# Patient Record
Sex: FEMALE | Race: WHITE | NOT HISPANIC OR LATINO | URBAN - METROPOLITAN AREA
[De-identification: names, ages, dates, MRNs, and addresses within clinical notes are randomized per-mention and may not be internally consistent; named-entity substitution may affect disease eponyms.]

---

## 2017-07-09 ENCOUNTER — INPATIENT (INPATIENT)
Facility: HOSPITAL | Age: 37
LOS: 2 days | Discharge: HOME | End: 2017-07-12
Attending: SPECIALIST | Admitting: SPECIALIST

## 2017-07-09 DIAGNOSIS — Z3A.00 WEEKS OF GESTATION OF PREGNANCY NOT SPECIFIED: ICD-10-CM

## 2017-07-09 DIAGNOSIS — O26.899 OTHER SPECIFIED PREGNANCY RELATED CONDITIONS, UNSPECIFIED TRIMESTER: ICD-10-CM

## 2017-07-09 PROBLEM — Z00.00 ENCOUNTER FOR PREVENTIVE HEALTH EXAMINATION: Status: ACTIVE | Noted: 2017-07-09

## 2017-07-09 LAB
BASOPHILS # BLD AUTO: 0.02 K/UL — SIGNIFICANT CHANGE UP (ref 0–0.2)
BASOPHILS NFR BLD AUTO: 0.3 % — SIGNIFICANT CHANGE UP (ref 0–2)
BLD GP AB SCN SERPL QL: NEGATIVE — SIGNIFICANT CHANGE UP
EOSINOPHIL # BLD AUTO: 0.18 K/UL — SIGNIFICANT CHANGE UP (ref 0–0.5)
EOSINOPHIL NFR BLD AUTO: 2.3 % — SIGNIFICANT CHANGE UP (ref 0–6)
HCT VFR BLD CALC: 32.9 % — LOW (ref 34.5–45)
HGB BLD-MCNC: 11 G/DL — LOW (ref 11.5–15.5)
IMM GRANULOCYTES # BLD AUTO: 0.02 # — SIGNIFICANT CHANGE UP
IMM GRANULOCYTES NFR BLD AUTO: 0.3 % — SIGNIFICANT CHANGE UP (ref 0–1.5)
LYMPHOCYTES # BLD AUTO: 1.94 K/UL — SIGNIFICANT CHANGE UP (ref 1–3.3)
LYMPHOCYTES # BLD AUTO: 24.4 % — SIGNIFICANT CHANGE UP (ref 13–44)
MCHC RBC-ENTMCNC: 31.3 PG — SIGNIFICANT CHANGE UP (ref 27–34)
MCHC RBC-ENTMCNC: 33.4 % — SIGNIFICANT CHANGE UP (ref 32–36)
MCV RBC AUTO: 93.7 FL — SIGNIFICANT CHANGE UP (ref 80–100)
MONOCYTES # BLD AUTO: 0.57 K/UL — SIGNIFICANT CHANGE UP (ref 0–0.9)
MONOCYTES NFR BLD AUTO: 7.2 % — SIGNIFICANT CHANGE UP (ref 2–14)
NEUTROPHILS # BLD AUTO: 5.21 K/UL — SIGNIFICANT CHANGE UP (ref 1.8–7.4)
NEUTROPHILS NFR BLD AUTO: 65.5 % — SIGNIFICANT CHANGE UP (ref 43–77)
NRBC # FLD: 0 — SIGNIFICANT CHANGE UP
PLATELET # BLD AUTO: 164 K/UL — SIGNIFICANT CHANGE UP (ref 150–400)
PMV BLD: 12.1 FL — SIGNIFICANT CHANGE UP (ref 7–13)
RBC # BLD: 3.51 M/UL — LOW (ref 3.8–5.2)
RBC # FLD: 14 % — SIGNIFICANT CHANGE UP (ref 10.3–14.5)
RH IG SCN BLD-IMP: POSITIVE — SIGNIFICANT CHANGE UP
RH IG SCN BLD-IMP: POSITIVE — SIGNIFICANT CHANGE UP
WBC # BLD: 7.94 K/UL — SIGNIFICANT CHANGE UP (ref 3.8–10.5)
WBC # FLD AUTO: 7.94 K/UL — SIGNIFICANT CHANGE UP (ref 3.8–10.5)

## 2017-07-09 RX ORDER — SODIUM CHLORIDE 9 MG/ML
1000 INJECTION, SOLUTION INTRAVENOUS ONCE
Qty: 0 | Refills: 0 | Status: DISCONTINUED | OUTPATIENT
Start: 2017-07-09 | End: 2017-07-10

## 2017-07-09 RX ORDER — SODIUM CHLORIDE 9 MG/ML
1000 INJECTION, SOLUTION INTRAVENOUS
Qty: 0 | Refills: 0 | Status: DISCONTINUED | OUTPATIENT
Start: 2017-07-09 | End: 2017-07-10

## 2017-07-09 RX ORDER — OXYTOCIN 10 UNIT/ML
333.33 VIAL (ML) INJECTION
Qty: 20 | Refills: 0 | Status: DISCONTINUED | OUTPATIENT
Start: 2017-07-09 | End: 2017-07-10

## 2017-07-09 RX ADMIN — SODIUM CHLORIDE 125 MILLILITER(S): 9 INJECTION, SOLUTION INTRAVENOUS at 21:14

## 2017-07-10 VITALS — WEIGHT: 158.73 LBS | HEIGHT: 63 IN

## 2017-07-10 LAB — T PALLIDUM AB TITR SER: NEGATIVE — SIGNIFICANT CHANGE UP

## 2017-07-10 RX ORDER — MORPHINE SULFATE 50 MG/1
4 CAPSULE, EXTENDED RELEASE ORAL ONCE
Qty: 0 | Refills: 0 | Status: DISCONTINUED | OUTPATIENT
Start: 2017-07-10 | End: 2017-07-10

## 2017-07-10 RX ORDER — HYDROCORTISONE 1 %
1 OINTMENT (GRAM) TOPICAL EVERY 4 HOURS
Qty: 0 | Refills: 0 | Status: DISCONTINUED | OUTPATIENT
Start: 2017-07-10 | End: 2017-07-10

## 2017-07-10 RX ORDER — OXYCODONE HYDROCHLORIDE 5 MG/1
5 TABLET ORAL
Qty: 0 | Refills: 0 | Status: DISCONTINUED | OUTPATIENT
Start: 2017-07-10 | End: 2017-07-12

## 2017-07-10 RX ORDER — SIMETHICONE 80 MG/1
80 TABLET, CHEWABLE ORAL EVERY 6 HOURS
Qty: 0 | Refills: 0 | Status: DISCONTINUED | OUTPATIENT
Start: 2017-07-10 | End: 2017-07-12

## 2017-07-10 RX ORDER — LANOLIN
1 OINTMENT (GRAM) TOPICAL EVERY 6 HOURS
Qty: 0 | Refills: 0 | Status: DISCONTINUED | OUTPATIENT
Start: 2017-07-10 | End: 2017-07-12

## 2017-07-10 RX ORDER — MAGNESIUM HYDROXIDE 400 MG/1
30 TABLET, CHEWABLE ORAL
Qty: 0 | Refills: 0 | Status: DISCONTINUED | OUTPATIENT
Start: 2017-07-10 | End: 2017-07-12

## 2017-07-10 RX ORDER — IBUPROFEN 200 MG
600 TABLET ORAL EVERY 6 HOURS
Qty: 0 | Refills: 0 | Status: COMPLETED | OUTPATIENT
Start: 2017-07-10 | End: 2018-06-08

## 2017-07-10 RX ORDER — KETOROLAC TROMETHAMINE 30 MG/ML
30 SYRINGE (ML) INJECTION ONCE
Qty: 0 | Refills: 0 | Status: DISCONTINUED | OUTPATIENT
Start: 2017-07-10 | End: 2017-07-10

## 2017-07-10 RX ORDER — DIBUCAINE 1 %
1 OINTMENT (GRAM) RECTAL EVERY 4 HOURS
Qty: 0 | Refills: 0 | Status: DISCONTINUED | OUTPATIENT
Start: 2017-07-10 | End: 2017-07-12

## 2017-07-10 RX ORDER — SODIUM CHLORIDE 9 MG/ML
3 INJECTION INTRAMUSCULAR; INTRAVENOUS; SUBCUTANEOUS EVERY 8 HOURS
Qty: 0 | Refills: 0 | Status: DISCONTINUED | OUTPATIENT
Start: 2017-07-10 | End: 2017-07-11

## 2017-07-10 RX ORDER — PRAMOXINE HYDROCHLORIDE 150 MG/15G
1 AEROSOL, FOAM RECTAL EVERY 4 HOURS
Qty: 0 | Refills: 0 | Status: DISCONTINUED | OUTPATIENT
Start: 2017-07-10 | End: 2017-07-12

## 2017-07-10 RX ORDER — PRAMOXINE HYDROCHLORIDE 150 MG/15G
1 AEROSOL, FOAM RECTAL EVERY 4 HOURS
Qty: 0 | Refills: 0 | Status: DISCONTINUED | OUTPATIENT
Start: 2017-07-10 | End: 2017-07-10

## 2017-07-10 RX ORDER — AER TRAVELER 0.5 G/1
1 SOLUTION RECTAL; TOPICAL EVERY 4 HOURS
Qty: 0 | Refills: 0 | Status: DISCONTINUED | OUTPATIENT
Start: 2017-07-10 | End: 2017-07-12

## 2017-07-10 RX ORDER — DIPHENHYDRAMINE HCL 50 MG
25 CAPSULE ORAL EVERY 6 HOURS
Qty: 0 | Refills: 0 | Status: DISCONTINUED | OUTPATIENT
Start: 2017-07-10 | End: 2017-07-12

## 2017-07-10 RX ORDER — IBUPROFEN 200 MG
600 TABLET ORAL EVERY 6 HOURS
Qty: 0 | Refills: 0 | Status: DISCONTINUED | OUTPATIENT
Start: 2017-07-10 | End: 2017-07-12

## 2017-07-10 RX ORDER — GLYCERIN ADULT
1 SUPPOSITORY, RECTAL RECTAL AT BEDTIME
Qty: 0 | Refills: 0 | Status: DISCONTINUED | OUTPATIENT
Start: 2017-07-10 | End: 2017-07-12

## 2017-07-10 RX ORDER — OXYCODONE HYDROCHLORIDE 5 MG/1
5 TABLET ORAL EVERY 4 HOURS
Qty: 0 | Refills: 0 | Status: DISCONTINUED | OUTPATIENT
Start: 2017-07-10 | End: 2017-07-12

## 2017-07-10 RX ORDER — OXYTOCIN 10 UNIT/ML
2 VIAL (ML) INJECTION
Qty: 30 | Refills: 0 | Status: DISCONTINUED | OUTPATIENT
Start: 2017-07-10 | End: 2017-07-10

## 2017-07-10 RX ORDER — HYDROCORTISONE 1 %
1 OINTMENT (GRAM) TOPICAL EVERY 4 HOURS
Qty: 0 | Refills: 0 | Status: DISCONTINUED | OUTPATIENT
Start: 2017-07-10 | End: 2017-07-12

## 2017-07-10 RX ORDER — OXYTOCIN 10 UNIT/ML
41.67 VIAL (ML) INJECTION
Qty: 20 | Refills: 0 | Status: DISCONTINUED | OUTPATIENT
Start: 2017-07-10 | End: 2017-07-10

## 2017-07-10 RX ORDER — DOCUSATE SODIUM 100 MG
100 CAPSULE ORAL
Qty: 0 | Refills: 0 | Status: DISCONTINUED | OUTPATIENT
Start: 2017-07-10 | End: 2017-07-12

## 2017-07-10 RX ORDER — TETANUS TOXOID, REDUCED DIPHTHERIA TOXOID AND ACELLULAR PERTUSSIS VACCINE, ADSORBED 5; 2.5; 8; 8; 2.5 [IU]/.5ML; [IU]/.5ML; UG/.5ML; UG/.5ML; UG/.5ML
0.5 SUSPENSION INTRAMUSCULAR ONCE
Qty: 0 | Refills: 0 | Status: DISCONTINUED | OUTPATIENT
Start: 2017-07-10 | End: 2017-07-12

## 2017-07-10 RX ORDER — SODIUM CHLORIDE 9 MG/ML
3 INJECTION INTRAMUSCULAR; INTRAVENOUS; SUBCUTANEOUS EVERY 8 HOURS
Qty: 0 | Refills: 0 | Status: DISCONTINUED | OUTPATIENT
Start: 2017-07-10 | End: 2017-07-10

## 2017-07-10 RX ORDER — DIBUCAINE 1 %
1 OINTMENT (GRAM) RECTAL EVERY 4 HOURS
Qty: 0 | Refills: 0 | Status: DISCONTINUED | OUTPATIENT
Start: 2017-07-10 | End: 2017-07-10

## 2017-07-10 RX ORDER — ACETAMINOPHEN 500 MG
975 TABLET ORAL EVERY 6 HOURS
Qty: 0 | Refills: 0 | Status: COMPLETED | OUTPATIENT
Start: 2017-07-10 | End: 2018-06-08

## 2017-07-10 RX ORDER — AER TRAVELER 0.5 G/1
1 SOLUTION RECTAL; TOPICAL EVERY 4 HOURS
Qty: 0 | Refills: 0 | Status: DISCONTINUED | OUTPATIENT
Start: 2017-07-10 | End: 2017-07-10

## 2017-07-10 RX ORDER — ACETAMINOPHEN 500 MG
975 TABLET ORAL EVERY 6 HOURS
Qty: 0 | Refills: 0 | Status: DISCONTINUED | OUTPATIENT
Start: 2017-07-10 | End: 2017-07-12

## 2017-07-10 RX ADMIN — SODIUM CHLORIDE 3 MILLILITER(S): 9 INJECTION INTRAMUSCULAR; INTRAVENOUS; SUBCUTANEOUS at 16:33

## 2017-07-10 RX ADMIN — MORPHINE SULFATE 4 MILLIGRAM(S): 50 CAPSULE, EXTENDED RELEASE ORAL at 01:42

## 2017-07-10 RX ADMIN — Medication 125 MILLIUNIT(S)/MIN: at 10:21

## 2017-07-10 RX ADMIN — Medication 975 MILLIGRAM(S): at 18:39

## 2017-07-10 RX ADMIN — Medication 975 MILLIGRAM(S): at 18:05

## 2017-07-10 RX ADMIN — Medication 2 MILLIUNIT(S)/MIN: at 05:44

## 2017-07-10 RX ADMIN — MORPHINE SULFATE 4 MILLIGRAM(S): 50 CAPSULE, EXTENDED RELEASE ORAL at 02:30

## 2017-07-10 NOTE — DISCHARGE NOTE OB - PATIENT PORTAL LINK FT
“You can access the FollowHealth Patient Portal, offered by [facility], by registering with the following website: [email or web address]”

## 2017-07-10 NOTE — DISCHARGE NOTE OB - MEDICATION SUMMARY - MEDICATIONS TO TAKE
I will START or STAY ON the medications listed below when I get home from the hospital:    Prenatal Multivitamins oral capsule  -- 1 tab(s) by mouth once a day  -- Indication: For Other pregnancy-related conditions, antepartum

## 2017-07-10 NOTE — DISCHARGE NOTE OB - MATERIALS PROVIDED
Vaccinations/[facility]  Screening Program/  Immunization Record/Breastfeeding Log/[facility] Hearing Screen Program/Guide to Postpartum Care/Bottle Feeding Log/Birth Certificate Instructions/Shaken Baby Prevention Handout

## 2017-07-10 NOTE — DISCHARGE NOTE OB - CARE PROVIDER_API CALL
Juliet Booker), Obstetrics and Gynecology  [address] [place] [address] [address]  [place] [address]  Phone: [phone]  Fax: [phone]

## 2017-07-10 NOTE — DISCHARGE NOTE OB - CARE PLAN
Principal Discharge DX:	 (normal spontaneous vaginal delivery)  Goal:	recovery  Instructions for follow-up, activity and diet:	regular

## 2017-07-11 RX ADMIN — SODIUM CHLORIDE 3 MILLILITER(S): 9 INJECTION INTRAMUSCULAR; INTRAVENOUS; SUBCUTANEOUS at 08:11

## 2017-07-11 RX ADMIN — Medication 975 MILLIGRAM(S): at 02:40

## 2017-07-11 RX ADMIN — SODIUM CHLORIDE 3 MILLILITER(S): 9 INJECTION INTRAMUSCULAR; INTRAVENOUS; SUBCUTANEOUS at 00:48

## 2017-07-11 RX ADMIN — Medication 1 TABLET(S): at 13:20

## 2017-07-11 NOTE — PROVIDER CONTACT NOTE (OTHER) - ASSESSMENT
RN re-assessed patient's heart by palpating a radial pulse of 58, however patient had just awoke from sleep at that time.  Patient denies any complaints of chest pain, palpitations, shortness of breath, dizziness or lightheadedness.

## 2017-07-11 NOTE — PROGRESS NOTE ADULT - SUBJECTIVE AND OBJECTIVE BOX
S: Patient doing well. Minimal lochia. Pain controlled.    O: Vital Signs Last 24 Hrs  T(C): 36.5 (2017 05:41), Max: 37.2 (10 Jul 2017 08:53)  T(F): 97.7 (2017 05:41), Max: 99 (10 Jul 2017 08:53)  HR: 56 (2017 05:41) (56 - 85)  BP: 115/75 (2017 05:41) (95/54 - 115/75)  BP(mean): --  RR: 18 (2017 05:41) (16 - 18)  SpO2: 98% (2017 05:41) (97% - 100%)    Gen: NAD  Abd: soft, NT, ND, fundus firm below umbilicus  Lochia: moderate  Ext: no tenderness    Labs:                        11.0   7.94  )-----------( 164      ( 2017 21:15 )             32.9   Apos    A: 36y PPD#1 s/p  doing well.    Plan:  continue care  discharge instructions given

## 2017-07-12 VITALS — HEART RATE: 70 BPM

## 2019-03-06 ENCOUNTER — INPATIENT (INPATIENT)
Facility: HOSPITAL | Age: 39
LOS: 1 days | Discharge: HOME | End: 2019-03-08
Attending: OBSTETRICS & GYNECOLOGY | Admitting: OBSTETRICS & GYNECOLOGY

## 2019-03-06 VITALS
RESPIRATION RATE: 16 BRPM | TEMPERATURE: 97 F | SYSTOLIC BLOOD PRESSURE: 112 MMHG | DIASTOLIC BLOOD PRESSURE: 61 MMHG | HEART RATE: 74 BPM

## 2019-03-06 DIAGNOSIS — Z3A.00 WEEKS OF GESTATION OF PREGNANCY NOT SPECIFIED: ICD-10-CM

## 2019-03-06 DIAGNOSIS — O26.90 PREGNANCY RELATED CONDITIONS, UNSPECIFIED, UNSPECIFIED TRIMESTER: ICD-10-CM

## 2019-03-06 LAB
BASOPHILS # BLD AUTO: 0.02 K/UL — SIGNIFICANT CHANGE UP (ref 0–0.2)
BASOPHILS NFR BLD AUTO: 0.2 % — SIGNIFICANT CHANGE UP (ref 0–2)
BLD GP AB SCN SERPL QL: NEGATIVE — SIGNIFICANT CHANGE UP
EOSINOPHIL # BLD AUTO: 0.18 K/UL — SIGNIFICANT CHANGE UP (ref 0–0.5)
EOSINOPHIL NFR BLD AUTO: 2.2 % — SIGNIFICANT CHANGE UP (ref 0–6)
HCT VFR BLD CALC: 35.8 % — SIGNIFICANT CHANGE UP (ref 34.5–45)
HGB BLD-MCNC: 11.9 G/DL — SIGNIFICANT CHANGE UP (ref 11.5–15.5)
IMM GRANULOCYTES NFR BLD AUTO: 0.4 % — SIGNIFICANT CHANGE UP (ref 0–1.5)
LYMPHOCYTES # BLD AUTO: 1.98 K/UL — SIGNIFICANT CHANGE UP (ref 1–3.3)
LYMPHOCYTES # BLD AUTO: 23.7 % — SIGNIFICANT CHANGE UP (ref 13–44)
MCHC RBC-ENTMCNC: 30 PG — SIGNIFICANT CHANGE UP (ref 27–34)
MCHC RBC-ENTMCNC: 33.2 % — SIGNIFICANT CHANGE UP (ref 32–36)
MCV RBC AUTO: 90.2 FL — SIGNIFICANT CHANGE UP (ref 80–100)
MONOCYTES # BLD AUTO: 0.54 K/UL — SIGNIFICANT CHANGE UP (ref 0–0.9)
MONOCYTES NFR BLD AUTO: 6.5 % — SIGNIFICANT CHANGE UP (ref 2–14)
NEUTROPHILS # BLD AUTO: 5.59 K/UL — SIGNIFICANT CHANGE UP (ref 1.8–7.4)
NEUTROPHILS NFR BLD AUTO: 67 % — SIGNIFICANT CHANGE UP (ref 43–77)
NRBC # FLD: 0 K/UL — LOW (ref 25–125)
PLATELET # BLD AUTO: 221 K/UL — SIGNIFICANT CHANGE UP (ref 150–400)
PMV BLD: 11.8 FL — SIGNIFICANT CHANGE UP (ref 7–13)
RBC # BLD: 3.97 M/UL — SIGNIFICANT CHANGE UP (ref 3.8–5.2)
RBC # FLD: 13.3 % — SIGNIFICANT CHANGE UP (ref 10.3–14.5)
RH IG SCN BLD-IMP: POSITIVE — SIGNIFICANT CHANGE UP
T PALLIDUM AB TITR SER: NEGATIVE — SIGNIFICANT CHANGE UP
WBC # BLD: 8.34 K/UL — SIGNIFICANT CHANGE UP (ref 3.8–10.5)
WBC # FLD AUTO: 8.34 K/UL — SIGNIFICANT CHANGE UP (ref 3.8–10.5)

## 2019-03-06 RX ORDER — OXYCODONE HYDROCHLORIDE 5 MG/1
5 TABLET ORAL
Qty: 0 | Refills: 0 | Status: DISCONTINUED | OUTPATIENT
Start: 2019-03-06 | End: 2019-03-08

## 2019-03-06 RX ORDER — SODIUM CHLORIDE 9 MG/ML
1000 INJECTION, SOLUTION INTRAVENOUS ONCE
Qty: 0 | Refills: 0 | Status: DISCONTINUED | OUTPATIENT
Start: 2019-03-06 | End: 2019-03-06

## 2019-03-06 RX ORDER — SIMETHICONE 80 MG/1
80 TABLET, CHEWABLE ORAL EVERY 6 HOURS
Qty: 0 | Refills: 0 | Status: DISCONTINUED | OUTPATIENT
Start: 2019-03-06 | End: 2019-03-08

## 2019-03-06 RX ORDER — DIPHENHYDRAMINE HCL 50 MG
25 CAPSULE ORAL EVERY 6 HOURS
Qty: 0 | Refills: 0 | Status: DISCONTINUED | OUTPATIENT
Start: 2019-03-06 | End: 2019-03-08

## 2019-03-06 RX ORDER — MAGNESIUM HYDROXIDE 400 MG/1
30 TABLET, CHEWABLE ORAL
Qty: 0 | Refills: 0 | Status: DISCONTINUED | OUTPATIENT
Start: 2019-03-06 | End: 2019-03-08

## 2019-03-06 RX ORDER — PRAMOXINE HYDROCHLORIDE 150 MG/15G
1 AEROSOL, FOAM RECTAL EVERY 4 HOURS
Qty: 0 | Refills: 0 | Status: DISCONTINUED | OUTPATIENT
Start: 2019-03-06 | End: 2019-03-08

## 2019-03-06 RX ORDER — OXYCODONE HYDROCHLORIDE 5 MG/1
5 TABLET ORAL EVERY 4 HOURS
Qty: 0 | Refills: 0 | Status: DISCONTINUED | OUTPATIENT
Start: 2019-03-06 | End: 2019-03-08

## 2019-03-06 RX ORDER — HYDROCORTISONE 1 %
1 OINTMENT (GRAM) TOPICAL EVERY 4 HOURS
Qty: 0 | Refills: 0 | Status: DISCONTINUED | OUTPATIENT
Start: 2019-03-06 | End: 2019-03-06

## 2019-03-06 RX ORDER — LANOLIN
1 OINTMENT (GRAM) TOPICAL EVERY 6 HOURS
Qty: 0 | Refills: 0 | Status: DISCONTINUED | OUTPATIENT
Start: 2019-03-06 | End: 2019-03-08

## 2019-03-06 RX ORDER — INFLUENZA VIRUS VACCINE 15; 15; 15; 15 UG/.5ML; UG/.5ML; UG/.5ML; UG/.5ML
0.5 SUSPENSION INTRAMUSCULAR ONCE
Qty: 0 | Refills: 0 | Status: COMPLETED | OUTPATIENT
Start: 2019-03-06 | End: 2019-03-06

## 2019-03-06 RX ORDER — OXYTOCIN 10 UNIT/ML
41.67 VIAL (ML) INJECTION
Qty: 20 | Refills: 0 | Status: DISCONTINUED | OUTPATIENT
Start: 2019-03-06 | End: 2019-03-06

## 2019-03-06 RX ORDER — DOCUSATE SODIUM 100 MG
100 CAPSULE ORAL
Qty: 0 | Refills: 0 | Status: DISCONTINUED | OUTPATIENT
Start: 2019-03-06 | End: 2019-03-08

## 2019-03-06 RX ORDER — DIBUCAINE 1 %
1 OINTMENT (GRAM) RECTAL EVERY 4 HOURS
Qty: 0 | Refills: 0 | Status: DISCONTINUED | OUTPATIENT
Start: 2019-03-06 | End: 2019-03-06

## 2019-03-06 RX ORDER — PRAMOXINE HYDROCHLORIDE 150 MG/15G
1 AEROSOL, FOAM RECTAL EVERY 4 HOURS
Qty: 0 | Refills: 0 | Status: DISCONTINUED | OUTPATIENT
Start: 2019-03-06 | End: 2019-03-06

## 2019-03-06 RX ORDER — AER TRAVELER 0.5 G/1
1 SOLUTION RECTAL; TOPICAL EVERY 4 HOURS
Qty: 0 | Refills: 0 | Status: DISCONTINUED | OUTPATIENT
Start: 2019-03-06 | End: 2019-03-08

## 2019-03-06 RX ORDER — TETANUS TOXOID, REDUCED DIPHTHERIA TOXOID AND ACELLULAR PERTUSSIS VACCINE, ADSORBED 5; 2.5; 8; 8; 2.5 [IU]/.5ML; [IU]/.5ML; UG/.5ML; UG/.5ML; UG/.5ML
0.5 SUSPENSION INTRAMUSCULAR ONCE
Qty: 0 | Refills: 0 | Status: DISCONTINUED | OUTPATIENT
Start: 2019-03-06 | End: 2019-03-08

## 2019-03-06 RX ORDER — ACETAMINOPHEN 500 MG
975 TABLET ORAL EVERY 6 HOURS
Qty: 0 | Refills: 0 | Status: DISCONTINUED | OUTPATIENT
Start: 2019-03-06 | End: 2019-03-08

## 2019-03-06 RX ORDER — ACETAMINOPHEN 500 MG
975 TABLET ORAL EVERY 6 HOURS
Qty: 0 | Refills: 0 | Status: COMPLETED | OUTPATIENT
Start: 2019-03-06 | End: 2020-02-02

## 2019-03-06 RX ORDER — IBUPROFEN 200 MG
600 TABLET ORAL EVERY 6 HOURS
Qty: 0 | Refills: 0 | Status: COMPLETED | OUTPATIENT
Start: 2019-03-06 | End: 2020-02-02

## 2019-03-06 RX ORDER — SODIUM CHLORIDE 9 MG/ML
3 INJECTION INTRAMUSCULAR; INTRAVENOUS; SUBCUTANEOUS EVERY 8 HOURS
Qty: 0 | Refills: 0 | Status: DISCONTINUED | OUTPATIENT
Start: 2019-03-06 | End: 2019-03-08

## 2019-03-06 RX ORDER — GLYCERIN ADULT
1 SUPPOSITORY, RECTAL RECTAL AT BEDTIME
Qty: 0 | Refills: 0 | Status: DISCONTINUED | OUTPATIENT
Start: 2019-03-06 | End: 2019-03-08

## 2019-03-06 RX ORDER — DIBUCAINE 1 %
1 OINTMENT (GRAM) RECTAL EVERY 4 HOURS
Qty: 0 | Refills: 0 | Status: DISCONTINUED | OUTPATIENT
Start: 2019-03-06 | End: 2019-03-08

## 2019-03-06 RX ORDER — IBUPROFEN 200 MG
600 TABLET ORAL EVERY 6 HOURS
Qty: 0 | Refills: 0 | Status: DISCONTINUED | OUTPATIENT
Start: 2019-03-06 | End: 2019-03-08

## 2019-03-06 RX ORDER — SODIUM CHLORIDE 9 MG/ML
3 INJECTION INTRAMUSCULAR; INTRAVENOUS; SUBCUTANEOUS EVERY 8 HOURS
Qty: 0 | Refills: 0 | Status: DISCONTINUED | OUTPATIENT
Start: 2019-03-06 | End: 2019-03-06

## 2019-03-06 RX ORDER — OXYTOCIN 10 UNIT/ML
333.33 VIAL (ML) INJECTION
Qty: 20 | Refills: 0 | Status: COMPLETED | OUTPATIENT
Start: 2019-03-06

## 2019-03-06 RX ORDER — SODIUM CHLORIDE 9 MG/ML
1000 INJECTION, SOLUTION INTRAVENOUS
Qty: 0 | Refills: 0 | Status: DISCONTINUED | OUTPATIENT
Start: 2019-03-06 | End: 2019-03-06

## 2019-03-06 RX ORDER — AER TRAVELER 0.5 G/1
1 SOLUTION RECTAL; TOPICAL EVERY 4 HOURS
Qty: 0 | Refills: 0 | Status: DISCONTINUED | OUTPATIENT
Start: 2019-03-06 | End: 2019-03-06

## 2019-03-06 RX ORDER — KETOROLAC TROMETHAMINE 30 MG/ML
30 SYRINGE (ML) INJECTION ONCE
Qty: 0 | Refills: 0 | Status: DISCONTINUED | OUTPATIENT
Start: 2019-03-06 | End: 2019-03-06

## 2019-03-06 RX ORDER — HYDROCORTISONE 1 %
1 OINTMENT (GRAM) TOPICAL EVERY 4 HOURS
Qty: 0 | Refills: 0 | Status: DISCONTINUED | OUTPATIENT
Start: 2019-03-06 | End: 2019-03-08

## 2019-03-06 RX ORDER — OXYTOCIN 10 UNIT/ML
333.33 VIAL (ML) INJECTION
Qty: 20 | Refills: 0 | Status: DISCONTINUED | OUTPATIENT
Start: 2019-03-06 | End: 2019-03-06

## 2019-03-06 RX ADMIN — Medication 1 APPLICATION(S): at 05:56

## 2019-03-06 RX ADMIN — Medication 1 TABLET(S): at 11:55

## 2019-03-06 RX ADMIN — SODIUM CHLORIDE 3 MILLILITER(S): 9 INJECTION INTRAMUSCULAR; INTRAVENOUS; SUBCUTANEOUS at 14:18

## 2019-03-06 RX ADMIN — Medication 125 MILLIUNIT(S)/MIN: at 04:08

## 2019-03-06 RX ADMIN — Medication 1 APPLICATION(S): at 05:55

## 2019-03-06 RX ADMIN — PRAMOXINE HYDROCHLORIDE 1 APPLICATION(S): 150 AEROSOL, FOAM RECTAL at 05:56

## 2019-03-06 RX ADMIN — SODIUM CHLORIDE 3 MILLILITER(S): 9 INJECTION INTRAMUSCULAR; INTRAVENOUS; SUBCUTANEOUS at 05:55

## 2019-03-06 RX ADMIN — Medication 975 MILLIGRAM(S): at 12:19

## 2019-03-06 RX ADMIN — Medication 975 MILLIGRAM(S): at 11:55

## 2019-03-06 RX ADMIN — AER TRAVELER 1 APPLICATION(S): 0.5 SOLUTION RECTAL; TOPICAL at 05:56

## 2019-03-06 RX ADMIN — Medication 1000 MILLIUNIT(S)/MIN: at 03:30

## 2019-03-06 RX ADMIN — SODIUM CHLORIDE 3 MILLILITER(S): 9 INJECTION INTRAMUSCULAR; INTRAVENOUS; SUBCUTANEOUS at 22:44

## 2019-03-07 ENCOUNTER — TRANSCRIPTION ENCOUNTER (OUTPATIENT)
Age: 39
End: 2019-03-07

## 2019-03-07 RX ORDER — IBUPROFEN 200 MG
1 TABLET ORAL
Qty: 0 | Refills: 0 | DISCHARGE
Start: 2019-03-07

## 2019-03-07 RX ORDER — ACETAMINOPHEN 500 MG
3 TABLET ORAL
Qty: 0 | Refills: 0 | DISCHARGE
Start: 2019-03-07

## 2019-03-07 NOTE — DISCHARGE NOTE OB - PERSISTENT HEADACHE, BLURRED VISION
0730 Bedside and Verbal shift change report given to Ileana Foss (oncoming nurse) by Blanca Hines (offgoing nurse). Report included the following information SBAR, Kardex, ED Summary, Procedure Summary, Intake/Output, MAR, Accordion and Recent Results. 0900 Dr. Sybil Frye @ bedside, orders received. Statement Selected

## 2019-03-07 NOTE — LACTATION INITIAL EVALUATION - INTERVENTION OUTCOME
verbalizes understanding/nbn demonstrated  deep latch and  performed  with sucking and swallowing  noted .  reviewed  breastfeeding  log  and daily  nbn  goals.  offered  support  as needed.

## 2019-03-07 NOTE — DISCHARGE NOTE OB - PATIENT PORTAL LINK FT
You can access the [facility] Patient Portal, offered by [facility], by registering with the following website: [email or web address]

## 2019-03-07 NOTE — DISCHARGE NOTE OB - MEDICATION SUMMARY - MEDICATIONS TO TAKE
I will START or STAY ON the medications listed below when I get home from the hospital:    ibuprofen 600 mg oral tablet  -- 1 tab(s) by mouth every 6 hours  -- Indication: For Pregnancy related condition    acetaminophen 325 mg oral tablet  -- 3 tab(s) by mouth every 6 hours  -- Indication: For Pregnancy related condition    Prenatal Multivitamins oral capsule  -- 1 tab(s) by mouth once a day  -- Indication: For Pregnancy related condition

## 2019-03-07 NOTE — LACTATION INITIAL EVALUATION - LACTATION INTERVENTIONS
initiate skin to skin/initiate hand expression routine/assisted with deep latch and positioning discussed  signs  of  effective  feeding and  swallowing.  discussed  compression at  breast when  nbn  stops  drinking  and  is  still sucking.  instructed  to offer both  breast at a feeding ,feed on cue and safe  skin to skin. .

## 2019-03-08 VITALS
SYSTOLIC BLOOD PRESSURE: 100 MMHG | RESPIRATION RATE: 18 BRPM | HEART RATE: 59 BPM | DIASTOLIC BLOOD PRESSURE: 56 MMHG | OXYGEN SATURATION: 100 % | TEMPERATURE: 98 F

## 2019-03-14 ENCOUNTER — TRANSCRIPTION ENCOUNTER (OUTPATIENT)
Age: 39
End: 2019-03-14

## 2019-04-21 ENCOUNTER — TRANSCRIPTION ENCOUNTER (OUTPATIENT)
Age: 39
End: 2019-04-21

## 2020-08-10 ENCOUNTER — APPOINTMENT (OUTPATIENT)
Dept: OBGYN | Facility: CLINIC | Age: 40
End: 2020-08-10
Payer: MEDICAID

## 2020-08-10 PROCEDURE — 36415 COLL VENOUS BLD VENIPUNCTURE: CPT

## 2020-08-10 PROCEDURE — 99385 PREV VISIT NEW AGE 18-39: CPT

## 2020-08-10 PROCEDURE — 81025 URINE PREGNANCY TEST: CPT

## 2020-08-17 ENCOUNTER — APPOINTMENT (OUTPATIENT)
Dept: OBGYN | Facility: CLINIC | Age: 40
End: 2020-08-17
Payer: MEDICAID

## 2020-08-17 PROCEDURE — 0502F SUBSEQUENT PRENATAL CARE: CPT

## 2020-08-31 ENCOUNTER — APPOINTMENT (OUTPATIENT)
Dept: ANTEPARTUM | Facility: CLINIC | Age: 40
End: 2020-08-31
Payer: MEDICAID

## 2020-08-31 PROCEDURE — 0502F SUBSEQUENT PRENATAL CARE: CPT

## 2020-09-28 ENCOUNTER — APPOINTMENT (OUTPATIENT)
Dept: OBGYN | Facility: CLINIC | Age: 40
End: 2020-09-28

## 2020-10-19 ENCOUNTER — APPOINTMENT (OUTPATIENT)
Dept: ANTEPARTUM | Facility: CLINIC | Age: 40
End: 2020-10-19
Payer: MEDICAID

## 2020-10-19 PROCEDURE — 76816 OB US FOLLOW-UP PER FETUS: CPT

## 2020-10-19 PROCEDURE — 76819 FETAL BIOPHYS PROFIL W/O NST: CPT

## 2020-10-19 PROCEDURE — 99072 ADDL SUPL MATRL&STAF TM PHE: CPT

## 2020-10-19 PROCEDURE — 76820 UMBILICAL ARTERY ECHO: CPT

## 2020-11-02 ENCOUNTER — APPOINTMENT (OUTPATIENT)
Dept: OBGYN | Facility: CLINIC | Age: 40
End: 2020-11-02
Payer: MEDICAID

## 2020-11-02 PROCEDURE — 0502F SUBSEQUENT PRENATAL CARE: CPT

## 2020-11-16 ENCOUNTER — APPOINTMENT (OUTPATIENT)
Dept: OBGYN | Facility: CLINIC | Age: 40
End: 2020-11-16
Payer: MEDICAID

## 2020-11-16 PROCEDURE — 0502F SUBSEQUENT PRENATAL CARE: CPT

## 2020-11-23 ENCOUNTER — APPOINTMENT (OUTPATIENT)
Dept: OBGYN | Facility: CLINIC | Age: 40
End: 2020-11-23
Payer: MEDICAID

## 2020-11-23 PROCEDURE — 0502F SUBSEQUENT PRENATAL CARE: CPT

## 2020-11-24 ENCOUNTER — APPOINTMENT (OUTPATIENT)
Dept: ANTEPARTUM | Facility: HOSPITAL | Age: 40
End: 2020-11-24

## 2020-11-24 ENCOUNTER — APPOINTMENT (OUTPATIENT)
Dept: ANTEPARTUM | Facility: CLINIC | Age: 40
End: 2020-11-24

## 2020-11-30 ENCOUNTER — APPOINTMENT (OUTPATIENT)
Dept: ANTEPARTUM | Facility: CLINIC | Age: 40
End: 2020-11-30
Payer: MEDICAID

## 2020-11-30 PROCEDURE — 76816 OB US FOLLOW-UP PER FETUS: CPT

## 2020-11-30 PROCEDURE — 99072 ADDL SUPL MATRL&STAF TM PHE: CPT

## 2020-11-30 PROCEDURE — 76819 FETAL BIOPHYS PROFIL W/O NST: CPT

## 2020-12-07 ENCOUNTER — APPOINTMENT (OUTPATIENT)
Dept: ANTEPARTUM | Facility: CLINIC | Age: 40
End: 2020-12-07
Payer: MEDICAID

## 2020-12-07 ENCOUNTER — APPOINTMENT (OUTPATIENT)
Dept: OBGYN | Facility: CLINIC | Age: 40
End: 2020-12-07
Payer: MEDICAID

## 2020-12-07 PROCEDURE — ZZZZZ: CPT

## 2020-12-07 PROCEDURE — 0502F SUBSEQUENT PRENATAL CARE: CPT

## 2020-12-12 ENCOUNTER — OUTPATIENT (OUTPATIENT)
Dept: INPATIENT UNIT | Facility: HOSPITAL | Age: 40
LOS: 1 days | Discharge: HOME | End: 2020-12-12
Payer: MEDICAID

## 2020-12-12 VITALS — HEART RATE: 68 BPM | SYSTOLIC BLOOD PRESSURE: 105 MMHG | DIASTOLIC BLOOD PRESSURE: 65 MMHG

## 2020-12-12 VITALS — HEART RATE: 72 BPM | DIASTOLIC BLOOD PRESSURE: 66 MMHG | SYSTOLIC BLOOD PRESSURE: 108 MMHG

## 2020-12-12 DIAGNOSIS — Z3A.00 WEEKS OF GESTATION OF PREGNANCY NOT SPECIFIED: ICD-10-CM

## 2020-12-12 DIAGNOSIS — O26.899 OTHER SPECIFIED PREGNANCY RELATED CONDITIONS, UNSPECIFIED TRIMESTER: ICD-10-CM

## 2020-12-12 PROCEDURE — 99214 OFFICE O/P EST MOD 30 MIN: CPT

## 2020-12-12 NOTE — OB PROVIDER TRIAGE NOTE - HISTORY OF PRESENT ILLNESS
40 yr old  @ 39.4 wks, presents with nausea yesterday, no vomiting , chills yesterday for a short time, vaginal bleeding, reports seeing spotting in the toilet yesterday after a loose bowel movement, reports history of hemorrhoids, did not see any blood on toilet from vagina, none since. Denies LOF, Ctx. Reports positive fetal movement. GBS negative.     PNI: Denies  Obhx:    Vaginal delivery   FT 2007 7#3   FT 2009 6#6   FT  7#5   FT 3/6/2019 6#8  Gynhx: denies  Surghx: denies  Medhx: denies

## 2020-12-12 NOTE — OB PROVIDER TRIAGE NOTE - NSOBPROVIDERNOTE_OBGYN_ALL_OB_FT
40 yr old  @ 39.4 wks  r/o labor  no evidence of labor/VB/ROM  Labor precautions  Re-turn to triage with onset of painful contractions  Discussed with Dr. Ortega

## 2020-12-14 ENCOUNTER — APPOINTMENT (OUTPATIENT)
Dept: OBGYN | Facility: CLINIC | Age: 40
End: 2020-12-14
Payer: MEDICAID

## 2020-12-14 PROCEDURE — 0502F SUBSEQUENT PRENATAL CARE: CPT

## 2020-12-15 ENCOUNTER — OUTPATIENT (OUTPATIENT)
Dept: INPATIENT UNIT | Facility: HOSPITAL | Age: 40
LOS: 1 days | Discharge: HOME | End: 2020-12-15

## 2020-12-15 ENCOUNTER — TRANSCRIPTION ENCOUNTER (OUTPATIENT)
Age: 40
End: 2020-12-15

## 2020-12-15 ENCOUNTER — INPATIENT (INPATIENT)
Facility: HOSPITAL | Age: 40
LOS: 1 days | Discharge: HOME | End: 2020-12-17
Attending: OBSTETRICS & GYNECOLOGY | Admitting: OBSTETRICS & GYNECOLOGY
Payer: MEDICAID

## 2020-12-15 VITALS
HEART RATE: 67 BPM | RESPIRATION RATE: 16 BRPM | SYSTOLIC BLOOD PRESSURE: 116 MMHG | TEMPERATURE: 98 F | DIASTOLIC BLOOD PRESSURE: 67 MMHG

## 2020-12-15 VITALS
DIASTOLIC BLOOD PRESSURE: 66 MMHG | RESPIRATION RATE: 18 BRPM | SYSTOLIC BLOOD PRESSURE: 104 MMHG | TEMPERATURE: 98 F | HEART RATE: 76 BPM

## 2020-12-15 VITALS — HEART RATE: 72 BPM | DIASTOLIC BLOOD PRESSURE: 67 MMHG | SYSTOLIC BLOOD PRESSURE: 101 MMHG

## 2020-12-15 DIAGNOSIS — O26.899 OTHER SPECIFIED PREGNANCY RELATED CONDITIONS, UNSPECIFIED TRIMESTER: ICD-10-CM

## 2020-12-15 DIAGNOSIS — Z3A.00 WEEKS OF GESTATION OF PREGNANCY NOT SPECIFIED: ICD-10-CM

## 2020-12-15 RX ORDER — OXYTOCIN 10 UNIT/ML
333.33 VIAL (ML) INJECTION
Qty: 20 | Refills: 0 | Status: DISCONTINUED | OUTPATIENT
Start: 2020-12-15 | End: 2020-12-16

## 2020-12-15 RX ORDER — SODIUM CHLORIDE 9 MG/ML
1000 INJECTION, SOLUTION INTRAVENOUS
Refills: 0 | Status: DISCONTINUED | OUTPATIENT
Start: 2020-12-15 | End: 2020-12-16

## 2020-12-15 NOTE — OB PROVIDER TRIAGE NOTE - HISTORY OF PRESENT ILLNESS
41 yo  @ 40 wks GA with c/o ctx's that started this AM becoming uncomfortable. Patient denies any LOF, VB, and reports good FM's  PNC: Dr Penaloza

## 2020-12-15 NOTE — OB PROVIDER TRIAGE NOTE - NSOBPROVIDERNOTE_OBGYN_ALL_OB_FT
39 yo  at 40 wks GA r/o Labor  - 39 yo  at 40 wks GA r/o Labor  -No evidence of active labor at this time  - Labor precautions  - Fetal kick counts    Plan for  Discharge   -patient to ambulate and allow labor  s/s to progress and return when labor  progresses  DW Dr Key ( OB Covering Attending)and Dr Penaloza aware

## 2020-12-15 NOTE — OB PROVIDER TRIAGE NOTE - NSHPPHYSICALEXAM_GEN_ALL_CORE
A/O x 3  NAD  Vital Signs Last 24 Hrs  T(C): 36.9 (15 Dec 2020 13:19), Max: 36.9 (15 Dec 2020 13:16)  T(F): 98.42 (15 Dec 2020 13:19), Max: 98.42 (15 Dec 2020 13:19)  HR: 64 (15 Dec 2020 13:38) (64 - 67)  BP: 101/68 (15 Dec 2020 13:38) (101/68 - 116/67)  BP(mean): --  RR: 16 (15 Dec 2020 13:16) (16 - 16)  SpO2: --  Abdomen is soft NT and gravid with no ctx palpable  SVE: 2/50/-3 (Unchanged)  FHR: Cat 1, Reactive  TOCO: irregular ctx q 3-6  TAS: A/O x 3  NAD  Vital Signs Last 24 Hrs  T(C): 36.9 (15 Dec 2020 13:19), Max: 36.9 (15 Dec 2020 13:16)  T(F): 98.42 (15 Dec 2020 13:19), Max: 98.42 (15 Dec 2020 13:19)  HR: 64 (15 Dec 2020 13:38) (64 - 67)  BP: 101/68 (15 Dec 2020 13:38) (101/68 - 116/67)  BP(mean): --  RR: 16 (15 Dec 2020 13:16) (16 - 16)  SpO2: --  Abdomen is soft NT and gravid with no ctx palpable  SVE: 2/50/-3 (Unchanged)  FHR: Cat 1, Reactive  TOCO: irregular ctx q 3-6  TAS: VTX, MARTINEZ @ 13.5, BPP 8/8

## 2020-12-15 NOTE — OB RN TRIAGE NOTE - FALLEN IN THE PAST
Maame,   Phone: (   )    -  Fax: (   )    -    Yayo Sevilla), Internal Medicine; Neurology  [address] [place] [address]  Phone: [phone]  Fax: [phone]
no

## 2020-12-16 LAB
BASOPHILS # BLD AUTO: 0.03 K/UL — SIGNIFICANT CHANGE UP (ref 0–0.2)
BASOPHILS NFR BLD AUTO: 0.3 % — SIGNIFICANT CHANGE UP (ref 0–2)
BLD GP AB SCN SERPL QL: NEGATIVE — SIGNIFICANT CHANGE UP
EOSINOPHIL # BLD AUTO: 0.09 K/UL — SIGNIFICANT CHANGE UP (ref 0–0.5)
EOSINOPHIL NFR BLD AUTO: 0.9 % — SIGNIFICANT CHANGE UP (ref 0–6)
HCT VFR BLD CALC: 36 % — SIGNIFICANT CHANGE UP (ref 34.5–45)
HGB BLD-MCNC: 11.5 G/DL — SIGNIFICANT CHANGE UP (ref 11.5–15.5)
IANC: 6.87 K/UL — SIGNIFICANT CHANGE UP (ref 1.5–8.5)
IMM GRANULOCYTES NFR BLD AUTO: 0.2 % — SIGNIFICANT CHANGE UP (ref 0–1.5)
LYMPHOCYTES # BLD AUTO: 2.17 K/UL — SIGNIFICANT CHANGE UP (ref 1–3.3)
LYMPHOCYTES # BLD AUTO: 22.3 % — SIGNIFICANT CHANGE UP (ref 13–44)
MCHC RBC-ENTMCNC: 29.3 PG — SIGNIFICANT CHANGE UP (ref 27–34)
MCHC RBC-ENTMCNC: 31.9 GM/DL — LOW (ref 32–36)
MCV RBC AUTO: 91.8 FL — SIGNIFICANT CHANGE UP (ref 80–100)
MONOCYTES # BLD AUTO: 0.55 K/UL — SIGNIFICANT CHANGE UP (ref 0–0.9)
MONOCYTES NFR BLD AUTO: 5.7 % — SIGNIFICANT CHANGE UP (ref 2–14)
NEUTROPHILS # BLD AUTO: 6.87 K/UL — SIGNIFICANT CHANGE UP (ref 1.8–7.4)
NEUTROPHILS NFR BLD AUTO: 70.6 % — SIGNIFICANT CHANGE UP (ref 43–77)
NRBC # BLD: 0 /100 WBCS — SIGNIFICANT CHANGE UP
NRBC # FLD: 0 K/UL — SIGNIFICANT CHANGE UP
PLATELET # BLD AUTO: 241 K/UL — SIGNIFICANT CHANGE UP (ref 150–400)
RBC # BLD: 3.92 M/UL — SIGNIFICANT CHANGE UP (ref 3.8–5.2)
RBC # FLD: 13.2 % — SIGNIFICANT CHANGE UP (ref 10.3–14.5)
RH IG SCN BLD-IMP: POSITIVE — SIGNIFICANT CHANGE UP
SARS-COV-2 RNA SPEC QL NAA+PROBE: SIGNIFICANT CHANGE UP
T PALLIDUM AB TITR SER: NEGATIVE — SIGNIFICANT CHANGE UP
WBC # BLD: 9.73 K/UL — SIGNIFICANT CHANGE UP (ref 3.8–10.5)
WBC # FLD AUTO: 9.73 K/UL — SIGNIFICANT CHANGE UP (ref 3.8–10.5)

## 2020-12-16 PROCEDURE — 59400 OBSTETRICAL CARE: CPT | Mod: U9

## 2020-12-16 RX ORDER — SODIUM CHLORIDE 9 MG/ML
3 INJECTION INTRAMUSCULAR; INTRAVENOUS; SUBCUTANEOUS EVERY 8 HOURS
Refills: 0 | Status: DISCONTINUED | OUTPATIENT
Start: 2020-12-16 | End: 2020-12-17

## 2020-12-16 RX ORDER — OXYCODONE HYDROCHLORIDE 5 MG/1
5 TABLET ORAL
Refills: 0 | Status: DISCONTINUED | OUTPATIENT
Start: 2020-12-16 | End: 2020-12-17

## 2020-12-16 RX ORDER — SENNA PLUS 8.6 MG/1
1 TABLET ORAL
Refills: 0 | Status: DISCONTINUED | OUTPATIENT
Start: 2020-12-16 | End: 2020-12-17

## 2020-12-16 RX ORDER — ACETAMINOPHEN 500 MG
975 TABLET ORAL
Refills: 0 | Status: DISCONTINUED | OUTPATIENT
Start: 2020-12-16 | End: 2020-12-16

## 2020-12-16 RX ORDER — DIPHENHYDRAMINE HCL 50 MG
25 CAPSULE ORAL EVERY 6 HOURS
Refills: 0 | Status: DISCONTINUED | OUTPATIENT
Start: 2020-12-16 | End: 2020-12-17

## 2020-12-16 RX ORDER — LANOLIN
1 OINTMENT (GRAM) TOPICAL EVERY 6 HOURS
Refills: 0 | Status: DISCONTINUED | OUTPATIENT
Start: 2020-12-16 | End: 2020-12-17

## 2020-12-16 RX ORDER — DIBUCAINE 1 %
1 OINTMENT (GRAM) RECTAL EVERY 6 HOURS
Refills: 0 | Status: DISCONTINUED | OUTPATIENT
Start: 2020-12-16 | End: 2020-12-17

## 2020-12-16 RX ORDER — IBUPROFEN 200 MG
600 TABLET ORAL EVERY 6 HOURS
Refills: 0 | Status: COMPLETED | OUTPATIENT
Start: 2020-12-16 | End: 2021-11-14

## 2020-12-16 RX ORDER — OXYCODONE HYDROCHLORIDE 5 MG/1
5 TABLET ORAL ONCE
Refills: 0 | Status: DISCONTINUED | OUTPATIENT
Start: 2020-12-16 | End: 2020-12-17

## 2020-12-16 RX ORDER — MAGNESIUM HYDROXIDE 400 MG/1
30 TABLET, CHEWABLE ORAL
Refills: 0 | Status: DISCONTINUED | OUTPATIENT
Start: 2020-12-16 | End: 2020-12-17

## 2020-12-16 RX ORDER — AER TRAVELER 0.5 G/1
1 SOLUTION RECTAL; TOPICAL EVERY 4 HOURS
Refills: 0 | Status: DISCONTINUED | OUTPATIENT
Start: 2020-12-16 | End: 2020-12-17

## 2020-12-16 RX ORDER — TETANUS TOXOID, REDUCED DIPHTHERIA TOXOID AND ACELLULAR PERTUSSIS VACCINE, ADSORBED 5; 2.5; 8; 8; 2.5 [IU]/.5ML; [IU]/.5ML; UG/.5ML; UG/.5ML; UG/.5ML
0.5 SUSPENSION INTRAMUSCULAR ONCE
Refills: 0 | Status: DISCONTINUED | OUTPATIENT
Start: 2020-12-16 | End: 2020-12-17

## 2020-12-16 RX ORDER — BENZOCAINE 10 %
1 GEL (GRAM) MUCOUS MEMBRANE EVERY 6 HOURS
Refills: 0 | Status: DISCONTINUED | OUTPATIENT
Start: 2020-12-16 | End: 2020-12-17

## 2020-12-16 RX ORDER — INFLUENZA VIRUS VACCINE 15; 15; 15; 15 UG/.5ML; UG/.5ML; UG/.5ML; UG/.5ML
0.5 SUSPENSION INTRAMUSCULAR ONCE
Refills: 0 | Status: DISCONTINUED | OUTPATIENT
Start: 2020-12-16 | End: 2020-12-17

## 2020-12-16 RX ORDER — PRAMOXINE HYDROCHLORIDE 150 MG/15G
1 AEROSOL, FOAM RECTAL EVERY 4 HOURS
Refills: 0 | Status: DISCONTINUED | OUTPATIENT
Start: 2020-12-16 | End: 2020-12-17

## 2020-12-16 RX ORDER — ACETAMINOPHEN 500 MG
500 TABLET ORAL EVERY 6 HOURS
Refills: 0 | Status: DISCONTINUED | OUTPATIENT
Start: 2020-12-16 | End: 2020-12-16

## 2020-12-16 RX ORDER — IBUPROFEN 200 MG
600 TABLET ORAL EVERY 6 HOURS
Refills: 0 | Status: DISCONTINUED | OUTPATIENT
Start: 2020-12-16 | End: 2020-12-17

## 2020-12-16 RX ORDER — OXYTOCIN 10 UNIT/ML
2 VIAL (ML) INJECTION
Qty: 30 | Refills: 0 | Status: DISCONTINUED | OUTPATIENT
Start: 2020-12-16 | End: 2020-12-16

## 2020-12-16 RX ORDER — SIMETHICONE 80 MG/1
80 TABLET, CHEWABLE ORAL EVERY 4 HOURS
Refills: 0 | Status: DISCONTINUED | OUTPATIENT
Start: 2020-12-16 | End: 2020-12-17

## 2020-12-16 RX ORDER — OXYTOCIN 10 UNIT/ML
333.33 VIAL (ML) INJECTION
Qty: 20 | Refills: 0 | Status: DISCONTINUED | OUTPATIENT
Start: 2020-12-16 | End: 2020-12-17

## 2020-12-16 RX ORDER — HYDROCORTISONE 1 %
1 OINTMENT (GRAM) TOPICAL EVERY 6 HOURS
Refills: 0 | Status: DISCONTINUED | OUTPATIENT
Start: 2020-12-16 | End: 2020-12-17

## 2020-12-16 RX ORDER — ACETAMINOPHEN 500 MG
650 TABLET ORAL EVERY 6 HOURS
Refills: 0 | Status: DISCONTINUED | OUTPATIENT
Start: 2020-12-16 | End: 2020-12-17

## 2020-12-16 RX ORDER — KETOROLAC TROMETHAMINE 30 MG/ML
30 SYRINGE (ML) INJECTION ONCE
Refills: 0 | Status: DISCONTINUED | OUTPATIENT
Start: 2020-12-16 | End: 2020-12-17

## 2020-12-16 RX ADMIN — Medication 2 MILLIUNIT(S)/MIN: at 01:48

## 2020-12-16 RX ADMIN — SENNA PLUS 1 TABLET(S): 8.6 TABLET ORAL at 17:09

## 2020-12-16 RX ADMIN — SODIUM CHLORIDE 3 MILLILITER(S): 9 INJECTION INTRAMUSCULAR; INTRAVENOUS; SUBCUTANEOUS at 14:24

## 2020-12-16 RX ADMIN — Medication 1 TABLET(S): at 11:36

## 2020-12-16 RX ADMIN — SODIUM CHLORIDE 3 MILLILITER(S): 9 INJECTION INTRAMUSCULAR; INTRAVENOUS; SUBCUTANEOUS at 21:10

## 2020-12-16 NOTE — DISCHARGE NOTE OB - MATERIALS PROVIDED
Vaccinations/[facility]  Screening Program/  Immunization Record/Breastfeeding Log/Breastfeeding Mother’s Support Group Information/Guide to Postpartum Care/[facility] Hearing Screen Program/Back To Sleep Handout/Shaken Baby Prevention Handout/Breastfeeding Guide and Packet/Birth Certificate Instructions

## 2020-12-16 NOTE — DISCHARGE NOTE OB - CARE PLAN
Principal Discharge DX:	Vaginal delivery  Goal:	recovery  Assessment and plan of treatment:	with Dr. Penaloza in 6 weeks

## 2020-12-16 NOTE — DISCHARGE NOTE OB - CARE PROVIDER_API CALL
Lokesh Penaloza  MATERNAL/FETAL MEDICINE  [address] [place] [address]  Phone: [phone]  Fax: [phone]  Follow Up Time:

## 2020-12-16 NOTE — OB PROVIDER TRIAGE NOTE - NSOBPROVIDERNOTE_OBGYN_ALL_OB_FT
40y  at 40w1d presents to triage c/o strong contractions  Reports +FM, no vaginal bleeding, no ROM or LOF  Prenatal care: Dr Penaloza  GBS: Unknown    GYN: Denies any h/o STDs, fibroids, ovarian Cyst, or abnormal pap smear  OBH:    FT 2007 7#3   FT 2009 6#6   FT  7#5   FT 3/6/2019 6#8  PAST MEDICAL & SURGICAL HISTORY: Denies    No Known Allergies    MEDICATIONS  (PRN):    T(C): 36.9 (12-15-20 @ 22:51), Max: 36.9 (12-15-20 @ 13:16)  HR: 68 (20 @ 00:47) (64 - 85)  BP: 107/56 (20 @ 00:47) (101/67 - 120/72)  RR: 18 (12-15-20 @ 22:51) (16 - 18)  SpO2: 98% (20 @ 00:46) (97% - 100%)    Heart: RRR  Lungs: CTA  Abdomen: Gravid, soft, NT    NST: Reactive with moderate variability, Category 1 tracing  [place]: Irregular contractions  VE: /-2, intact membranes    A/P: 40y  at 40w1d  D/w Dr Ortega  -Admit to labor and delivery  -Pain Management: Epidural  -Cont EFM/[place]  -Admission labs: CBC, RPR, T&S  -IV hydration  -Clear liquid diet

## 2020-12-16 NOTE — PROVIDER CONTACT NOTE (OTHER) - ACTION/TREATMENT ORDERED:
MD Higgins made aware. pt offered tylenol 975mg but refused dosage. pt encouraged to eat & increase fluid intake. will continue to monitor.

## 2020-12-16 NOTE — OB PROVIDER TRIAGE NOTE - NSHPPHYSICALEXAM_GEN_ALL_CORE
T(C): 36.9 (12-15-20 @ 22:51), Max: 36.9 (12-15-20 @ 13:16)  HR: 68 (12-16-20 @ 00:47) (64 - 85)  BP: 107/56 (12-16-20 @ 00:47) (101/67 - 120/72)  RR: 18 (12-15-20 @ 22:51) (16 - 18)  SpO2: 98% (12-16-20 @ 00:46) (97% - 100%)    Heart: RRR  Lungs: CTA  Abdomen: Gravid, soft, NT    NST: Reactive with moderate variability, Category 1 tracing  [place]: Irregular contractions  VE: 6/70/-2, intact membranes

## 2020-12-16 NOTE — OB PROVIDER H&P - ASSESSMENT
40y  at 40w1d presents to triage c/o strong contractions  Reports +FM, no vaginal bleeding, no ROM or LOF  Prenatal care: Dr Penaloza  GBS: Unknown    GYN: Denies any h/o STDs, fibroids, ovarian Cyst, or abnormal pap smear  OBH:    FT 2007 7#3   FT 2009 6#6   FT  7#5   FT 3/6/2019 6#8  PAST MEDICAL & SURGICAL HISTORY: Denies    No Known Allergies    MEDICATIONS  (PRN):    T(C): 36.9 (12-15-20 @ 22:51), Max: 36.9 (12-15-20 @ 13:16)  HR: 68 (20 @ 00:47) (64 - 85)  BP: 107/56 (20 @ 00:47) (101/67 - 120/72)  RR: 18 (12-15-20 @ 22:51) (16 - 18)  SpO2: 98% (20 @ 00:46) (97% - 100%)    Heart: RRR  Lungs: CTA  Abdomen: Gravid, soft, NT    NST: Reactive with moderate variability, Category 1 tracing  [place]: Irregular contractions  VE: /-2, intact membranes    A/P: 40y  at 40w1d  D/w Dr Ortega  -Admit to labor and delivery  -Pain Management: Epidural  -Cont EFM/[place]  -Admission labs: CBC, RPR, T&S  -IV hydration  -Clear liquid diet

## 2020-12-16 NOTE — OB PROVIDER H&P - NSHPPHYSICALEXAM_GEN_ALL_CORE
T(C): 36.9 (12-15-20 @ 22:51), Max: 36.9 (12-15-20 @ 13:16)  HR: 68 (12-16-20 @ 00:47) (64 - 85)  BP: 107/56 (12-16-20 @ 00:47) (101/67 - 120/72)  RR: 18 (12-15-20 @ 22:51) (16 - 18)  SpO2: 98% (12-16-20 @ 00:46) (97% - 100%)    Heart: RRR  Lungs: CTA  Abdomen: Gravid, soft, NT    NST: Reactive with moderate variability, Category 1 tracing  [place]: Irregular contractions  VE: 6/70/-2, intact membranes

## 2020-12-16 NOTE — OB RN DELIVERY SUMMARY - NS_SEPSISRSKCALC_OBGYN_ALL_OB_FT
EOS calculated successfully. EOS Risk Factor: 0.5/1000 live births ([facility] national incidence); GA=40w1d; Temp=99; ROM=2.6; GBS='Unknown'; Antibiotics='No antibiotics or any antibiotics < 2 hrs prior to birth'

## 2020-12-16 NOTE — OB PROVIDER TRIAGE NOTE - HISTORY OF PRESENT ILLNESS
40y  at 40w1d presents to triage c/o strong contractions  Reports +FM, no vaginal bleeding, no ROM or LOF  Prenatal care: Dr Penaloza  GBS: Unknown

## 2020-12-16 NOTE — OB RN DELIVERY SUMMARY - NS_DELIVERYROOM_OBGYN_ALL_OB_FT
Chief Complaint   Patient presents with   • Fever     Pt's father concerned about sinus infection       HISTORY OF PRESENT ILLNESS: Patient is a 8 y.o. male who presents today because he has a one-day history of headache, fevers. His father has been giving him some over-the-counter anti-inflammatory medication and that does help for a while. The child has had nausea and vomiting. One episode, has been able to eat since then.    Patient Active Problem List    Diagnosis Date Noted   • S/P exploratory laparotomy 05/13/2014   • Appendicitis 03/16/2014       Allergies:Review of patient's allergies indicates no known allergies.    Current Outpatient Prescriptions Ordered in [facility]   Medication Sig Dispense Refill   • amoxicillin (AMOXIL) 400 MG/5ML suspension Take 6.9 mL by mouth 2 times a day for 10 days. 138 mL 0   • azithromycin (ZITHROMAX) 200 MG/5ML Recon Susp 5 ml on day 1. Then 2.5 ml on day 2-5. 15 mL 0   • hydrocortisone 2.5 % lotion Apply sparingly daily as needed 120 mL 0   • ibuprofen (MOTRIN) 100 MG/5ML SUSP Take 10 mg/kg by mouth every 6 hours as needed.       No current Epic-ordered facility-administered medications on file.       Past Medical History   Diagnosis Date   • Eczema             No family status information on file.   No family history on file.    ROS:  Review of Systems   Constitutional: Positive for fever, chills, no weight loss and malaise/fatigue.   HENT: Negative for ear pain, nosebleeds, congestion, positive for sore throat and anterior neck pain.    Eyes: Negative for blurred vision.   Respiratory: Negative for cough, sputum production, shortness of breath and wheezing.    Cardiovascular: Negative for chest pain, palpitations, orthopnea and leg swelling.   Gastrointestinal: Negative for heartburn, positive for resolved nausea, vomiting and no abdominal pain.   Genitourinary: Negative for dysuria, urgency and frequency.     Exam:  Pulse 124, temperature 38.2 °C (100.8 °F), resp. rate 28,  weight 24.494 kg (54 lb), SpO2 94 %.  General:  Well nourished, well developed male in NAD  Head:Normocephalic, atraumatic  Eyes: PERRLA, EOM within normal limits, no conjunctival injection, no scleral icterus, visual fields and acuity grossly intact.  Ears: Normal shape and symmetry, no tenderness, no discharge. External canals are without any significant edema or erythema. Tympanic membranes are without any inflammation, no effusion. Gross auditory acuity is intact  Nose: Symmetrical without tenderness, no discharge.  Mouth: reasonable hygiene, he has bilateral tonsillar erythema , bilateral exudates and bilateral tonsillar enlargement. Uvula is midline  Neck: There is bilateral anterior cervical lymph node enlargement, tenderness, range of motion within normal limits, no tracheal deviation. No obvious thyroid enlargement.  Pulmonary: chest is symmetrical with respiration, no wheezes, crackles, or rhonchi.  Cardiovascular: regular rate and rhythm without murmurs, rubs, or gallops.  Extremities: no clubbing, cyanosis, or edema.    Please note that this dictation was created using voice recognition software. I have made every reasonable attempt to correct obvious errors, but I expect that there are errors of grammar and possibly content that I did not discover before finalizing the note.    Assessment/Plan:  1. Strep pharyngitis  amoxicillin (AMOXIL) 400 MG/5ML suspension    meets Centor criteria for treatment, recommended over-the-counter Tylenol or ibuprofen.    Followup with primary care in the next 7-10 days if not significantly improving, return to the urgent care or go to the emergency room sooner for any worsening of symptoms.          LDR 14

## 2020-12-16 NOTE — DISCHARGE NOTE OB - MEDICATION SUMMARY - MEDICATIONS TO TAKE
I will START or STAY ON the medications listed below when I get home from the hospital:    ibuprofen 600 mg oral tablet  -- 1 tab(s) by mouth every 6 hours, As Needed  -- Indication: For pain    acetaminophen 325 mg oral tablet  -- 2 tab(s) by mouth every 6 hours, As Needed  -- Indication: For pain    Prenatal Multivitamins with Folic Acid 1 mg oral tablet  -- 1 tab(s) by mouth once a day  -- Indication: For Vitamins

## 2020-12-16 NOTE — OB PROVIDER H&P - PROBLEM SELECTOR PLAN 1
D/w Dr Ortega  -Admit to labor and delivery  -Pain Management: Epidural  -Cont EFM/[place]  -Admission labs: CBC, RPR, T&S  -IV hydration  -Clear liquid diet

## 2020-12-16 NOTE — OB PROVIDER TRIAGE NOTE - NS_OBGYNHISTORY_OBGYN_ALL_OB_FT
GYN: Denies any h/o STDs, fibroids, ovarian Cyst, or abnormal pap smear  OBH:    FT 2007 7#3   FT 2009 6#6   FT  7#5   FT 3/6/2019 6#8

## 2020-12-16 NOTE — PROVIDER CONTACT NOTE (OTHER) - ASSESSMENT
pt c/o lightheadedness & headache. fundus firm bleeding light. vitals stable per flowsheet. mom denies eating dinner.

## 2020-12-16 NOTE — DISCHARGE NOTE OB - PATIENT PORTAL LINK FT
You can access the FollowMyHealth Patient Portal offered by [facility] by registering at the following website: [email or web address]. By joining [facility]’s FollowMyHealth portal, you will also be able to view your health information using other applications (apps) compatible with our system.

## 2020-12-17 VITALS
OXYGEN SATURATION: 100 % | RESPIRATION RATE: 17 BRPM | DIASTOLIC BLOOD PRESSURE: 66 MMHG | SYSTOLIC BLOOD PRESSURE: 111 MMHG | HEART RATE: 69 BPM | TEMPERATURE: 99 F

## 2020-12-17 LAB
SARS-COV-2 IGG SERPL QL IA: NEGATIVE — SIGNIFICANT CHANGE UP
SARS-COV-2 IGM SERPL IA-ACNC: <0.1 INDEX — SIGNIFICANT CHANGE UP

## 2020-12-17 RX ORDER — ACETAMINOPHEN 500 MG
2 TABLET ORAL
Qty: 0 | Refills: 0 | DISCHARGE
Start: 2020-12-17

## 2020-12-17 RX ORDER — IBUPROFEN 200 MG
1 TABLET ORAL
Qty: 0 | Refills: 0 | DISCHARGE
Start: 2020-12-17

## 2020-12-17 RX ADMIN — SODIUM CHLORIDE 3 MILLILITER(S): 9 INJECTION INTRAMUSCULAR; INTRAVENOUS; SUBCUTANEOUS at 05:45

## 2020-12-17 RX ADMIN — Medication 1 TABLET(S): at 11:31

## 2020-12-17 NOTE — PROGRESS NOTE ADULT - SUBJECTIVE AND OBJECTIVE BOX
OB Attending Progress Note:  PPD#1    S: 41yo PPD#1 s/p . Patient feels well. Pain is well controlled. She is tolerating a regular diet and passing flatus. She is voiding spontaneously. and ambulating without difficulty. She is breastfeeding. Denies CP/SOB. Denies lightheadedness/dizziness. Denies N/V/D.    O:  Vitals:  Vital Signs Last 24 Hrs  T(C): 36.4 (17 Dec 2020 05:36), Max: 36.8 (16 Dec 2020 14:00)  T(F): 97.6 (17 Dec 2020 05:36), Max: 98.3 (16 Dec 2020 14:00)  HR: 55 (17 Dec 2020 05:36) (55 - 67)  BP: 116/71 (17 Dec 2020 05:36) (101/61 - 116/71)  RR: 15 (17 Dec 2020 05:36) (15 - 18)  SpO2: 96% (17 Dec 2020 05:36) (96% - 99%)    MEDICATIONS  (STANDING):  acetaminophen   Tablet .. 650 milliGRAM(s) Oral every 6 hours  diphtheria/tetanus/pertussis (acellular) Vaccine (ADAcel) 0.5 milliLiter(s) IntraMuscular once  ibuprofen  Tablet. 600 milliGRAM(s) Oral every 6 hours  influenza   Vaccine 0.5 milliLiter(s) IntraMuscular once  prenatal multivitamin 1 Tablet(s) Oral daily  senna 1 Tablet(s) Oral two times a day  sodium chloride 0.9% lock flush 3 milliLiter(s) IV Push every 8 hours      Labs:  Blood type: A Positive  Rubella IgG: RPR: Negative                          11.5   9.73 >-----------< 241    ( 12-16 @ 00:23 )             36.0                  Physical Exam:  General: NAD  CV: RR  Pulm: Breathing comfortably on RA  Abdomen: soft, non-tender, non-distended, +BS, fundus firm  Vaginal: Lochia wnl  Extremities: No erythema/edema    A/P: 41yo PPD#1 s/p .   - Pain well controlled, continue current pain regimen  - Increase ambulation, SCDs when not ambulating  - Continue regular diet  - Discharge planning tomorrow      Rufina Briones MD

## 2021-07-22 NOTE — OB PROVIDER H&P - NS_FETALPRESENTATIONA_OBGYN_ALL_OB
TIA/ISCHEMIC/HEMORRHAGIC STROKE    YOUR STROKE RISK FACTORS INCLUDE:   It is important that you know your risk factors and that you work with your doctor on treatment and lifestyle changes to lower your risk of having a future stroke.  Personal Stroke Risks: Non-Modifable: Age over 55 years, Family history, Male, Race  Personal Stroke Risks: Modifiable: High blood pressure      MEDICATIONS:  · See Discharge Medication List  · Take medications as they are scheduled, and talk with your physician if there are problems taking medications.  · Keep a list of medications up to date and carry with you at all times.    VACCINES:  Most Recent Immunizations   Administered Date(s) Administered   • COVID-19 Moderna Vaccine 01/26/2021   • Influenza, seasonal, injectable, preservative free 09/11/2012   • Pneumococcal polysaccharide, adult, 23 valent 10/26/2011     Follow up with your doctor regarding influenza and/or pneumonia vaccine(s).    ACTIVITY:  Master has been cleared to resume activity per Rehab recommendations.    SMOKING:  • Avoid all tobacco products and second hand smoke.  Smoking Cessation Counseling offered, [place] Toll Free Quit Line: [phone]  [place] Toll Free Quit Line: [phone]    DIET:   Heart healthy    EDUCATION MATERIALS:  Education provided per discharge instructions and/or stroke information booklet given to patient/family member with understanding of provided materials.       Symptoms of a Stroke  During a stroke, blood stops flowing to part of the brain. This can damage areas in the brain that control the rest of the body. A stroke can happen to anyone at any age. Call 911 and get help right away if any of these symptoms come on suddenly, even if the symptoms don’t last.  Know the symptoms of a stroke  · Weakness. You may feel a sudden weakness, tingling, or a loss of feeling on one side of your face or body including your arm or leg.   · Vision problems. You may have sudden double  vision or trouble seeing in one or both eyes.  · Speech problems. You may have sudden trouble talking, slurred speech, or problems understanding others.  · Headache. You may have a sudden, severe headache.  · Movement problems. You may have sudden trouble walking, dizziness, a feeling of spinning, a loss of balance, a feeling of falling, or blackouts.  · Seizure. You may also have a seizure as the first symptom of a stroke.     B. E.  F.A.S.T. is an easy way to remember the signs of stroke.    Call 9-1-1 if:    BALANCE-Sudden loss of coordination or balance    EYES-Sudden change in vision    FACE-Sudden weakness on one side of the face or facial droop    ARM-Sudden arm or leg weakness or numbness    SPEECH-Sudden slurred speech, trouble speaking, trouble understanding speech    TERRIBLE HEADACHE-Sudden onset of a terrible headache    If someone has any of these symptoms, even if they go away, it is important to call 911 immediately. It is important to make note of the time the symptoms first appeared to receive the best treatment.   Cephalic

## 2021-10-31 ENCOUNTER — NON-APPOINTMENT (OUTPATIENT)
Age: 41
End: 2021-10-31

## 2021-11-01 ENCOUNTER — APPOINTMENT (OUTPATIENT)
Dept: OBGYN | Facility: CLINIC | Age: 41
End: 2021-11-01
Payer: MEDICAID

## 2021-11-01 ENCOUNTER — NON-APPOINTMENT (OUTPATIENT)
Age: 41
End: 2021-11-01

## 2021-11-01 VITALS — SYSTOLIC BLOOD PRESSURE: 116 MMHG | WEIGHT: 186 LBS | DIASTOLIC BLOOD PRESSURE: 76 MMHG

## 2021-11-01 DIAGNOSIS — O09.522 SUPERVISION OF ELDERLY MULTIGRAVIDA, SECOND TRIMESTER: ICD-10-CM

## 2021-11-01 DIAGNOSIS — O09.32 SUPERVISION OF PREGNANCY WITH INSUFFICIENT ANTENATAL CARE, SECOND TRIMESTER: ICD-10-CM

## 2021-11-01 DIAGNOSIS — O09.892 SUPERVISION OF OTHER HIGH RISK PREGNANCIES, SECOND TRIMESTER: ICD-10-CM

## 2021-11-01 DIAGNOSIS — O09.42 SUPERVISION OF PREGNANCY WITH GRAND MULTIPARITY, SECOND TRIMESTER: ICD-10-CM

## 2021-11-01 PROCEDURE — 99395 PREV VISIT EST AGE 18-39: CPT

## 2021-11-01 PROCEDURE — 36415 COLL VENOUS BLD VENIPUNCTURE: CPT

## 2021-11-01 PROCEDURE — 0500F INITIAL PRENATAL CARE VISIT: CPT

## 2021-11-02 ENCOUNTER — LABORATORY RESULT (OUTPATIENT)
Age: 41
End: 2021-11-02

## 2021-11-02 PROBLEM — O09.32 SUPERVISION OF PREGNANCY WITH INSUFFICIENT ANTENATAL CARE IN SECOND TRIMESTER: Status: ACTIVE | Noted: 2021-11-02

## 2021-11-02 PROBLEM — O09.892 SHORT INTERVAL BETWEEN PREGNANCIES COMPLICATING PREGNANCY, ANTEPARTUM, SECOND TRIMESTER: Status: ACTIVE | Noted: 2021-11-02

## 2021-11-02 PROBLEM — O09.522 MULTIGRAVIDA OF ADVANCED MATERNAL AGE IN SECOND TRIMESTER: Status: ACTIVE | Noted: 2021-11-02

## 2021-11-02 PROBLEM — O09.42 GRAND MULTIPARITY WITH CURRENT PREGNANCY IN SECOND TRIMESTER: Status: ACTIVE | Noted: 2021-11-02

## 2021-11-02 LAB
ABO + RH PNL BLD: NORMAL
APPEARANCE: CLEAR
BACTERIA: NEGATIVE
BASOPHILS # BLD AUTO: 0.02 K/UL
BASOPHILS NFR BLD AUTO: 0.2 %
BILIRUBIN URINE: NEGATIVE
BLD GP AB SCN SERPL QL: NORMAL
BLOOD URINE: NORMAL
COLOR: NORMAL
EOSINOPHIL # BLD AUTO: 0.25 K/UL
EOSINOPHIL NFR BLD AUTO: 2.9 %
ESTIMATED AVERAGE GLUCOSE: 117 MG/DL
GLUCOSE QUALITATIVE U: NEGATIVE
GLUCOSE SERPL-MCNC: 69 MG/DL
HAV IGM SER QL: NONREACTIVE
HBA1C MFR BLD HPLC: 5.7 %
HBV CORE IGM SER QL: NONREACTIVE
HBV SURFACE AG SER QL: NONREACTIVE
HBV SURFACE AG SER QL: NONREACTIVE
HCT VFR BLD CALC: 39.3 %
HCV AB SER QL: NONREACTIVE
HCV S/CO RATIO: 0.14 S/CO
HGB A MFR BLD: 97.7 %
HGB A2 MFR BLD: 2.3 %
HGB BLD-MCNC: 12.4 G/DL
HGB FRACT BLD-IMP: NORMAL
HIV1+2 AB SPEC QL IA.RAPID: NONREACTIVE
HPV HIGH+LOW RISK DNA PNL CVX: NOT DETECTED
HYALINE CASTS: 0 /LPF
IMM GRANULOCYTES NFR BLD AUTO: 0.2 %
KETONES URINE: ABNORMAL
LEUKOCYTE ESTERASE URINE: ABNORMAL
LYMPHOCYTES # BLD AUTO: 2.24 K/UL
LYMPHOCYTES NFR BLD AUTO: 26 %
MAN DIFF?: NORMAL
MCHC RBC-ENTMCNC: 30.4 PG
MCHC RBC-ENTMCNC: 31.6 GM/DL
MCV RBC AUTO: 96.3 FL
MICROSCOPIC-UA: NORMAL
MONOCYTES # BLD AUTO: 0.39 K/UL
MONOCYTES NFR BLD AUTO: 4.5 %
NEUTROPHILS # BLD AUTO: 5.69 K/UL
NEUTROPHILS NFR BLD AUTO: 66.2 %
NITRITE URINE: NEGATIVE
PH URINE: 6.5
PLATELET # BLD AUTO: 262 K/UL
PROTEIN URINE: NEGATIVE
RBC # BLD: 4.08 M/UL
RBC # FLD: 14.5 %
RED BLOOD CELLS URINE: 2 /HPF
SPECIFIC GRAVITY URINE: 1.01
SQUAMOUS EPITHELIAL CELLS: 3 /HPF
UROBILINOGEN URINE: NORMAL
WBC # FLD AUTO: 8.61 K/UL
WHITE BLOOD CELLS URINE: 3 /HPF

## 2021-11-03 LAB
BACTERIA UR CULT: NORMAL
C TRACH RRNA SPEC QL NAA+PROBE: NOT DETECTED
LEAD BLD-MCNC: <1 UG/DL
MEV IGG FLD QL IA: 263 AU/ML
MEV IGG+IGM SER-IMP: POSITIVE
MUV AB SER-ACNC: POSITIVE
MUV IGG SER QL IA: >300 AU/ML
N GONORRHOEA RRNA SPEC QL NAA+PROBE: NOT DETECTED
RUBV IGG FLD-ACNC: 8.3 INDEX
RUBV IGG SER-IMP: POSITIVE
SOURCE AMPLIFICATION: NORMAL
T PALLIDUM AB SER QL IA: NEGATIVE

## 2021-11-04 LAB
HPV 16 E6+E7 MRNA CVX QL NAA+PROBE: NOT DETECTED
HPV18+45 E6+E7 MRNA CVX QL NAA+PROBE: NOT DETECTED
M TB IFN-G BLD-IMP: NEGATIVE
QUANTIFERON TB PLUS MITOGEN MINUS NIL: 9.63 IU/ML
QUANTIFERON TB PLUS NIL: 0.03 IU/ML
QUANTIFERON TB PLUS TB1 MINUS NIL: 0.02 IU/ML
QUANTIFERON TB PLUS TB2 MINUS NIL: 0.04 IU/ML

## 2021-11-08 LAB
CFTR MUT TESTED BLD/T: NEGATIVE
CYTOLOGY CVX/VAG DOC THIN PREP: NORMAL

## 2021-11-15 ENCOUNTER — APPOINTMENT (OUTPATIENT)
Dept: OBGYN | Facility: CLINIC | Age: 41
End: 2021-11-15

## 2021-11-15 ENCOUNTER — NON-APPOINTMENT (OUTPATIENT)
Age: 41
End: 2021-11-15

## 2021-11-15 VITALS
DIASTOLIC BLOOD PRESSURE: 80 MMHG | BODY MASS INDEX: 33.84 KG/M2 | HEIGHT: 63 IN | SYSTOLIC BLOOD PRESSURE: 129 MMHG | WEIGHT: 191 LBS

## 2021-11-16 LAB
BASOPHILS # BLD AUTO: 0.02 K/UL
BASOPHILS NFR BLD AUTO: 0.3 %
EOSINOPHIL # BLD AUTO: 0.27 K/UL
EOSINOPHIL NFR BLD AUTO: 3.4 %
GLUCOSE 1H P 50 G GLC PO SERPL-MCNC: 104 MG/DL
HCT VFR BLD CALC: 37.6 %
HGB BLD-MCNC: 11.6 G/DL
IMM GRANULOCYTES NFR BLD AUTO: 0.3 %
LYMPHOCYTES # BLD AUTO: 2.04 K/UL
LYMPHOCYTES NFR BLD AUTO: 25.7 %
MAN DIFF?: NORMAL
MCHC RBC-ENTMCNC: 30.4 PG
MCHC RBC-ENTMCNC: 30.9 GM/DL
MCV RBC AUTO: 98.7 FL
MONOCYTES # BLD AUTO: 0.44 K/UL
MONOCYTES NFR BLD AUTO: 5.5 %
NEUTROPHILS # BLD AUTO: 5.16 K/UL
NEUTROPHILS NFR BLD AUTO: 64.8 %
PLATELET # BLD AUTO: 257 K/UL
RBC # BLD: 3.81 M/UL
RBC # FLD: 14.4 %
WBC # FLD AUTO: 7.95 K/UL

## 2021-11-29 LAB
CLARI ADDITIONAL INFO: NORMAL
CLARI CHROMOSOME 13: NORMAL
CLARI CHROMOSOME 18: NORMAL
CLARI CHROMOSOME 21: NORMAL
CLARI SEX CHROMOSOMES: NORMAL
CLARITEST NIPT: NORMAL
MATERNAL WEIGHT (LBS):: 191
PLEASE INCLUDE GENDER RESULTS ON THIS REPORT:: NORMAL
TYPE OF PREGNANCY:: NORMAL

## 2021-12-06 ENCOUNTER — APPOINTMENT (OUTPATIENT)
Dept: ANTEPARTUM | Facility: CLINIC | Age: 41
End: 2021-12-06
Payer: MEDICAID

## 2021-12-06 ENCOUNTER — APPOINTMENT (OUTPATIENT)
Dept: OBGYN | Facility: CLINIC | Age: 41
End: 2021-12-06

## 2021-12-06 VITALS — SYSTOLIC BLOOD PRESSURE: 114 MMHG | BODY MASS INDEX: 33.3 KG/M2 | WEIGHT: 188 LBS | DIASTOLIC BLOOD PRESSURE: 70 MMHG

## 2021-12-06 PROCEDURE — 76819 FETAL BIOPHYS PROFIL W/O NST: CPT

## 2021-12-06 PROCEDURE — 76816 OB US FOLLOW-UP PER FETUS: CPT

## 2021-12-08 NOTE — OB PROVIDER H&P - NS_PELVIS_OBGYN_ALL_OB
Dear [facility],     My name is BARB López, and I recently had the pleasure of evaluating information related to your medical condition on our [employer] digital platform. Based on the information available, I have determined that your request for care was unable to be safely and effectively completed. Due to this, it is my recommendation that you seek care at a local Urgent Care or Immediate [address] [place] [address] for an expedited and thorough face-to-face evaluation and consideration for diagnostic testing. Please note that the [employer] department that you submitted your request for care is equivalent to a virtual format of Urgent Care or Immediate [facility] level of care. If you did not intend to seek this particular level of care and potentially had a scheduled appointment with another provider, we recommend that you directly call the office of the provider you are attempting to meet with to rectify connection issues. Your care is very important to us. Please do not delay in acting on the recommendations for your care listed above. If you feel you may be experiencing a medical emergency, contact 1 immediately. If you have any questions regarding your visit, you may contact us at [phone].      Thank Lary Rivera Adequate

## 2021-12-20 ENCOUNTER — NON-APPOINTMENT (OUTPATIENT)
Age: 41
End: 2021-12-20

## 2021-12-20 ENCOUNTER — APPOINTMENT (OUTPATIENT)
Dept: OBGYN | Facility: CLINIC | Age: 41
End: 2021-12-20

## 2022-01-03 ENCOUNTER — APPOINTMENT (OUTPATIENT)
Dept: OBGYN | Facility: CLINIC | Age: 42
End: 2022-01-03

## 2022-01-12 ENCOUNTER — APPOINTMENT (OUTPATIENT)
Dept: OBGYN | Facility: CLINIC | Age: 42
End: 2022-01-12
Payer: MEDICAID

## 2022-01-12 VITALS — BODY MASS INDEX: 33.66 KG/M2 | DIASTOLIC BLOOD PRESSURE: 76 MMHG | WEIGHT: 190 LBS | SYSTOLIC BLOOD PRESSURE: 131 MMHG

## 2022-01-12 DIAGNOSIS — Z3A.35 35 WEEKS GESTATION OF PREGNANCY: ICD-10-CM

## 2022-01-12 DIAGNOSIS — Z3A.24 24 WEEKS GESTATION OF PREGNANCY: ICD-10-CM

## 2022-01-12 DIAGNOSIS — Z3A.26 26 WEEKS GESTATION OF PREGNANCY: ICD-10-CM

## 2022-01-12 DIAGNOSIS — R05.8 OTHER SPECIFIED COUGH: ICD-10-CM

## 2022-01-12 PROCEDURE — 0502F SUBSEQUENT PRENATAL CARE: CPT

## 2022-01-20 ENCOUNTER — APPOINTMENT (OUTPATIENT)
Dept: OBGYN | Facility: CLINIC | Age: 42
End: 2022-01-20

## 2022-02-21 ENCOUNTER — TRANSCRIPTION ENCOUNTER (OUTPATIENT)
Age: 42
End: 2022-02-21

## 2022-02-21 ENCOUNTER — INPATIENT (INPATIENT)
Facility: HOSPITAL | Age: 42
LOS: 1 days | Discharge: HOME | End: 2022-02-23
Attending: OBSTETRICS & GYNECOLOGY | Admitting: OBSTETRICS & GYNECOLOGY
Payer: MEDICAID

## 2022-02-21 VITALS — HEART RATE: 67 BPM | SYSTOLIC BLOOD PRESSURE: 113 MMHG | DIASTOLIC BLOOD PRESSURE: 79 MMHG

## 2022-02-21 DIAGNOSIS — O26.899 OTHER SPECIFIED PREGNANCY RELATED CONDITIONS, UNSPECIFIED TRIMESTER: ICD-10-CM

## 2022-02-21 DIAGNOSIS — Z3A.00 WEEKS OF GESTATION OF PREGNANCY NOT SPECIFIED: ICD-10-CM

## 2022-02-21 LAB
BASOPHILS # BLD AUTO: 0.01 K/UL — SIGNIFICANT CHANGE UP (ref 0–0.2)
BASOPHILS NFR BLD AUTO: 0.1 % — SIGNIFICANT CHANGE UP (ref 0–2)
BLD GP AB SCN SERPL QL: NEGATIVE — SIGNIFICANT CHANGE UP
COVID-19 SPIKE DOMAIN AB INTERP: POSITIVE
COVID-19 SPIKE DOMAIN ANTIBODY RESULT: 9.78 U/ML — HIGH
EOSINOPHIL # BLD AUTO: 0.22 K/UL — SIGNIFICANT CHANGE UP (ref 0–0.5)
EOSINOPHIL NFR BLD AUTO: 3 % — SIGNIFICANT CHANGE UP (ref 0–6)
HCT VFR BLD CALC: 33.8 % — LOW (ref 34.5–45)
HGB BLD-MCNC: 11.4 G/DL — LOW (ref 11.5–15.5)
IANC: 4.96 K/UL — SIGNIFICANT CHANGE UP (ref 1.5–8.5)
IMM GRANULOCYTES NFR BLD AUTO: 0.3 % — SIGNIFICANT CHANGE UP (ref 0–1.5)
LYMPHOCYTES # BLD AUTO: 1.76 K/UL — SIGNIFICANT CHANGE UP (ref 1–3.3)
LYMPHOCYTES # BLD AUTO: 23.9 % — SIGNIFICANT CHANGE UP (ref 13–44)
MCHC RBC-ENTMCNC: 30.2 PG — SIGNIFICANT CHANGE UP (ref 27–34)
MCHC RBC-ENTMCNC: 33.7 GM/DL — SIGNIFICANT CHANGE UP (ref 32–36)
MCV RBC AUTO: 89.4 FL — SIGNIFICANT CHANGE UP (ref 80–100)
MONOCYTES # BLD AUTO: 0.4 K/UL — SIGNIFICANT CHANGE UP (ref 0–0.9)
MONOCYTES NFR BLD AUTO: 5.4 % — SIGNIFICANT CHANGE UP (ref 2–14)
NEUTROPHILS # BLD AUTO: 4.96 K/UL — SIGNIFICANT CHANGE UP (ref 1.8–7.4)
NEUTROPHILS NFR BLD AUTO: 67.3 % — SIGNIFICANT CHANGE UP (ref 43–77)
NRBC # BLD: 0 /100 WBCS — SIGNIFICANT CHANGE UP
NRBC # FLD: 0 K/UL — SIGNIFICANT CHANGE UP
PLATELET # BLD AUTO: 216 K/UL — SIGNIFICANT CHANGE UP (ref 150–400)
RBC # BLD: 3.78 M/UL — LOW (ref 3.8–5.2)
RBC # FLD: 14.1 % — SIGNIFICANT CHANGE UP (ref 10.3–14.5)
RH IG SCN BLD-IMP: POSITIVE — SIGNIFICANT CHANGE UP
SARS-COV-2 IGG+IGM SERPL QL IA: 9.78 U/ML — HIGH
SARS-COV-2 IGG+IGM SERPL QL IA: POSITIVE
SARS-COV-2 RNA SPEC QL NAA+PROBE: DETECTED
WBC # BLD: 7.37 K/UL — SIGNIFICANT CHANGE UP (ref 3.8–10.5)
WBC # FLD AUTO: 7.37 K/UL — SIGNIFICANT CHANGE UP (ref 3.8–10.5)

## 2022-02-21 PROCEDURE — 59400 OBSTETRICAL CARE: CPT | Mod: U9

## 2022-02-21 RX ORDER — DIBUCAINE 1 %
1 OINTMENT (GRAM) RECTAL EVERY 6 HOURS
Refills: 0 | Status: DISCONTINUED | OUTPATIENT
Start: 2022-02-21 | End: 2022-02-23

## 2022-02-21 RX ORDER — MAGNESIUM HYDROXIDE 400 MG/1
30 TABLET, CHEWABLE ORAL
Refills: 0 | Status: DISCONTINUED | OUTPATIENT
Start: 2022-02-21 | End: 2022-02-23

## 2022-02-21 RX ORDER — KETOROLAC TROMETHAMINE 30 MG/ML
30 SYRINGE (ML) INJECTION ONCE
Refills: 0 | Status: DISCONTINUED | OUTPATIENT
Start: 2022-02-21 | End: 2022-02-22

## 2022-02-21 RX ORDER — SODIUM CHLORIDE 9 MG/ML
3 INJECTION INTRAMUSCULAR; INTRAVENOUS; SUBCUTANEOUS EVERY 8 HOURS
Refills: 0 | Status: DISCONTINUED | OUTPATIENT
Start: 2022-02-21 | End: 2022-02-23

## 2022-02-21 RX ORDER — PRAMOXINE HYDROCHLORIDE 150 MG/15G
1 AEROSOL, FOAM RECTAL EVERY 4 HOURS
Refills: 0 | Status: DISCONTINUED | OUTPATIENT
Start: 2022-02-21 | End: 2022-02-23

## 2022-02-21 RX ORDER — SODIUM CHLORIDE 9 MG/ML
1000 INJECTION, SOLUTION INTRAVENOUS ONCE
Refills: 0 | Status: COMPLETED | OUTPATIENT
Start: 2022-02-21 | End: 2022-02-21

## 2022-02-21 RX ORDER — OXYCODONE HYDROCHLORIDE 5 MG/1
5 TABLET ORAL
Refills: 0 | Status: DISCONTINUED | OUTPATIENT
Start: 2022-02-21 | End: 2022-02-23

## 2022-02-21 RX ORDER — SIMETHICONE 80 MG/1
80 TABLET, CHEWABLE ORAL EVERY 4 HOURS
Refills: 0 | Status: DISCONTINUED | OUTPATIENT
Start: 2022-02-21 | End: 2022-02-23

## 2022-02-21 RX ORDER — TETANUS TOXOID, REDUCED DIPHTHERIA TOXOID AND ACELLULAR PERTUSSIS VACCINE, ADSORBED 5; 2.5; 8; 8; 2.5 [IU]/.5ML; [IU]/.5ML; UG/.5ML; UG/.5ML; UG/.5ML
0.5 SUSPENSION INTRAMUSCULAR ONCE
Refills: 0 | Status: DISCONTINUED | OUTPATIENT
Start: 2022-02-21 | End: 2022-02-23

## 2022-02-21 RX ORDER — IBUPROFEN 200 MG
1 TABLET ORAL
Qty: 0 | Refills: 0 | DISCHARGE
Start: 2022-02-21

## 2022-02-21 RX ORDER — ACETAMINOPHEN 500 MG
3 TABLET ORAL
Qty: 0 | Refills: 0 | DISCHARGE
Start: 2022-02-21

## 2022-02-21 RX ORDER — SODIUM CHLORIDE 9 MG/ML
1000 INJECTION, SOLUTION INTRAVENOUS
Refills: 0 | Status: DISCONTINUED | OUTPATIENT
Start: 2022-02-21 | End: 2022-02-21

## 2022-02-21 RX ORDER — IBUPROFEN 200 MG
600 TABLET ORAL EVERY 6 HOURS
Refills: 0 | Status: COMPLETED | OUTPATIENT
Start: 2022-02-21 | End: 2023-01-20

## 2022-02-21 RX ORDER — OXYCODONE HYDROCHLORIDE 5 MG/1
5 TABLET ORAL ONCE
Refills: 0 | Status: DISCONTINUED | OUTPATIENT
Start: 2022-02-21 | End: 2022-02-23

## 2022-02-21 RX ORDER — DIPHENHYDRAMINE HCL 50 MG
25 CAPSULE ORAL EVERY 6 HOURS
Refills: 0 | Status: DISCONTINUED | OUTPATIENT
Start: 2022-02-21 | End: 2022-02-23

## 2022-02-21 RX ORDER — BENZOCAINE 10 %
1 GEL (GRAM) MUCOUS MEMBRANE EVERY 6 HOURS
Refills: 0 | Status: DISCONTINUED | OUTPATIENT
Start: 2022-02-21 | End: 2022-02-23

## 2022-02-21 RX ORDER — HYDROCORTISONE 1 %
1 OINTMENT (GRAM) TOPICAL EVERY 6 HOURS
Refills: 0 | Status: DISCONTINUED | OUTPATIENT
Start: 2022-02-21 | End: 2022-02-23

## 2022-02-21 RX ORDER — OXYTOCIN 10 UNIT/ML
333.33 VIAL (ML) INJECTION
Qty: 20 | Refills: 0 | Status: DISCONTINUED | OUTPATIENT
Start: 2022-02-21 | End: 2022-02-21

## 2022-02-21 RX ORDER — ACETAMINOPHEN 500 MG
975 TABLET ORAL
Refills: 0 | Status: DISCONTINUED | OUTPATIENT
Start: 2022-02-21 | End: 2022-02-23

## 2022-02-21 RX ORDER — AER TRAVELER 0.5 G/1
1 SOLUTION RECTAL; TOPICAL EVERY 4 HOURS
Refills: 0 | Status: DISCONTINUED | OUTPATIENT
Start: 2022-02-21 | End: 2022-02-23

## 2022-02-21 RX ORDER — INFLUENZA VIRUS VACCINE 15; 15; 15; 15 UG/.5ML; UG/.5ML; UG/.5ML; UG/.5ML
0.5 SUSPENSION INTRAMUSCULAR ONCE
Refills: 0 | Status: COMPLETED | OUTPATIENT
Start: 2022-02-21 | End: 2022-02-21

## 2022-02-21 RX ORDER — LANOLIN
1 OINTMENT (GRAM) TOPICAL EVERY 6 HOURS
Refills: 0 | Status: DISCONTINUED | OUTPATIENT
Start: 2022-02-21 | End: 2022-02-23

## 2022-02-21 RX ORDER — OXYTOCIN 10 UNIT/ML
333.33 VIAL (ML) INJECTION
Qty: 20 | Refills: 0 | Status: DISCONTINUED | OUTPATIENT
Start: 2022-02-21 | End: 2022-02-22

## 2022-02-21 RX ADMIN — SODIUM CHLORIDE 1000 MILLILITER(S): 9 INJECTION, SOLUTION INTRAVENOUS at 17:56

## 2022-02-21 RX ADMIN — Medication 1000 MILLIUNIT(S)/MIN: at 22:42

## 2022-02-21 NOTE — OB RN TRIAGE NOTE - FALL HARM RISK - UNIVERSAL INTERVENTIONS
Bed in lowest position, wheels locked, appropriate side rails in place/Call bell, personal items and telephone in reach/Instruct patient to call for assistance before getting out of bed or chair/Non-slip footwear when patient is out of bed/[place] to call system/Physically safe environment - no spills, clutter or unnecessary equipment/Purposeful Proactive Rounding/Room/bathroom lighting operational, light cord in reach

## 2022-02-21 NOTE — DISCHARGE NOTE OB - CARE PROVIDER_API CALL
Lokesh Penaloza)  MaternalFetal Medicine; Obstetrics and Gynecology  [address] [place] [id number]  Phone: [phone]  Fax: [phone]  Follow Up Time:

## 2022-02-21 NOTE — DISCHARGE NOTE OB - PATIENT PORTAL LINK FT
You can access the FollowMyHealth Patient Portal offered by [facility] by registering at the following website: [email or web address]. By joining [employer]’s FollowMyHealth portal, you will also be able to view your health information using other applications (apps) compatible with our system.

## 2022-02-21 NOTE — OB PROVIDER DELIVERY SUMMARY - NSLACERATION_OBGYN_ALL_OB
1) since her blood pressure has been doing well I would like for you to reduce your lisinopril also known as Prinivil to 5 mg from 10 mg a day    2) I would like for you to do either a virtual visit or office visit in about 6 to 8 weeks just like your blood pressure    3) your LDL which is your lousy cholesterol has come down significantly from 170 and 2019 and is currently 67 on the 20 mg of Crestor. Excellent job. No

## 2022-02-21 NOTE — DISCHARGE NOTE OB - CARE PLAN
Principal Discharge DX:	Vaginal delivery  Assessment and plan of treatment:	Nothing per vagina  Routine postpartum care   1

## 2022-02-21 NOTE — OB PROVIDER DELIVERY SUMMARY - NSSELHIDDEN_OBGYN_ALL_OB_FT
[NS_DeliveryAttending1_OBGYN_ALL_OB_FT:[id number]==],[NS_DeliveryAssist1_OBGYN_ALL_OB_FT:[email or web address]=]

## 2022-02-21 NOTE — OB PROVIDER TRIAGE NOTE - HISTORY OF PRESENT ILLNESS
41 y.o.  JOSEPH 2/15/2022 @ 40.6 weeks presents with c/o LOF since 3pm and irregular ctx. Pt denies VB. States +FM. Pt is late registrant to prenatal care at 24 weeks, short interval pregnancy, scant prenatal care.     allergies:  NKDA  medications:  prenatal vitamins  zinc  vitamin c supplement    med/surg hx:  obesity BMI 34.7    OBGYN hx:   -  7#3    - M 6#6    - M 7#5    - F 6#8    - F 6#9

## 2022-02-21 NOTE — OB RN DELIVERY SUMMARY - NSSELHIDDEN_OBGYN_ALL_OB_FT
[NS_DeliveryAttending1_OBGYN_ALL_OB_FT:[id number]==],[NS_DeliveryAssist1_OBGYN_ALL_OB_FT:[email or web address]=],[NS_DeliveryRN_OBGYN_ALL_OB_FT:[email or web address]]

## 2022-02-21 NOTE — DISCHARGE NOTE OB - MEDICATION SUMMARY - MEDICATIONS TO TAKE
I will START or STAY ON the medications listed below when I get home from the hospital:    acetaminophen 325 mg oral tablet  -- 3 tab(s) by mouth every 6 hours  -- Indication: For pain    ibuprofen 600 mg oral tablet  -- 1 tab(s) by mouth every 6 hours  -- Indication: For pain   I will START or STAY ON the medications listed below when I get home from the hospital:    ibuprofen 600 mg oral tablet  -- 1 tab(s) by mouth every 6 hours, As Needed  -- Indication: For pain    acetaminophen 325 mg oral tablet  -- 3 tab(s) by mouth every 6 hours, As Needed  -- Indication: For pain    Prenatal Multivitamins with Folic Acid 1 mg oral tablet  -- 1 tab(s) by mouth once a day  -- Indication: For supllement

## 2022-02-21 NOTE — OB PROVIDER TRIAGE NOTE - NSOBPROVIDERNOTE_OBGYN_ALL_OB_FT
a/p: 41 y.o.  JOSEPH 2/15/2022 @ 40.6 weeks in labor    GBS unknown  covid 19 swab collected and pending, partner covid 19 vaccine card copy in patient chart  discussed with Dr Sheppard  plan for epidural anesthesia   expectant management    Lea, NP

## 2022-02-21 NOTE — OB PROVIDER DELIVERY SUMMARY - NSPROVIDERDELIVERYNOTE_OBGYN_ALL_OB_FT
Pt became fully dilated and desired to push.   Spontaneous vaginal delivery of liveborn female.  Head, shoulders and body delivered easily.   Infant was passed to mother.  Cord clamping was delayed 1 minute. Cord was cut.  Placenta delivered spontaneously intact. Uterine massage was performed and pitocin was given.  Fundus was firm. No perineal, cervical, vaginal lacerations noted.  Good hemostasis was noted. Cytotec 1000mcg given prophylactically for grand parity.  Count correct x2. Mother and baby resting comfortably.       APGARS 9/9

## 2022-02-21 NOTE — OB RN PATIENT PROFILE - FALL HARM RISK - UNIVERSAL INTERVENTIONS
Bed in lowest position, wheels locked, appropriate side rails in place/Call bell, personal items and telephone in reach/Instruct patient to call for assistance before getting out of bed or chair/Non-slip footwear when patient is out of bed/[place] to call system/Physically safe environment - no spills, clutter or unnecessary equipment/Purposeful Proactive Rounding/Room/bathroom lighting operational, light cord in reach

## 2022-02-21 NOTE — OB RN DELIVERY SUMMARY - NS_SEPSISRSKCALC_OBGYN_ALL_OB_FT
EOS calculated successfully. EOS Risk Factor: 0.5/1000 live births ([facility] national incidence); GA=40w6d; Temp=98.6; ROM=6.15; GBS='Unknown'; Antibiotics='No antibiotics or any antibiotics < 2 hrs prior to birth'

## 2022-02-21 NOTE — OB PROVIDER TRIAGE NOTE - NSHPPHYSICALEXAM_GEN_ALL_CORE
abdomen soft, nontender  taus: cephalic presentation  sse: +pooling/nitrazine/ferning  sve: 7/90/-3/gross LOF, clear  nst in progress

## 2022-02-21 NOTE — OB PROVIDER H&P - ASSESSMENT
a/p: 41 y.o.  JOSEPH 2/15/2022 @ 40.6 weeks in labor    GBS unknown  PPH risk - 2 units PRBC on hold  covid 19 swab collected and pending, partner covid 19 vaccine card copy in patient chart  discussed with Dr Sheppard  plan for epidural anesthesia   expectant management    discussed with Dr Valdes, PGY-3    JMidy, NP a/p: 41 y.o.  JOSEPH 2/15/2022 @ 40.6 weeks in labor    GBS unknown  PPH risk - 2 units PRBC on hold  covid 19 swab collected and pending, partner covid 19 vaccine card copy in patient chart  discussed with Dr Sheppard  plan for epidural anesthesia   expectant management    discussed with Dr Valdes, PGY-3    ROSANNEidchance, NP      Attending Addendum: Agree with above. Pt presenting with SROM, labor. Admit to L&D, epidural PRN.  ELIUD Sheppard MD

## 2022-02-21 NOTE — OB PROVIDER TRIAGE NOTE - NS_STATION_OBGYN_ALL_OB_NU
Spiral Flap Text: The defect edges were debeveled with a #15 scalpel blade.  Given the location of the defect, shape of the defect and the proximity to free margins a spiral flap was deemed most appropriate.  Using a sterile surgical marker, an appropriate rotation flap was drawn incorporating the defect and placing the expected incisions within the relaxed skin tension lines where possible. The area thus outlined was incised deep to adipose tissue with a #15 scalpel blade.  The skin margins were undermined to an appropriate distance in all directions utilizing iris scissors. -3

## 2022-02-21 NOTE — DISCHARGE NOTE OB - NS MD DC FALL RISK RISK
For information on Fall & Injury Prevention, visit: [email or web address] OR  [email or web address] OR  https://www.cdc.gov/steadi/patient.html

## 2022-02-21 NOTE — OB PROVIDER LABOR PROGRESS NOTE - ASSESSMENT
Plan:  -continue expectant management  -labor down  -continue EFM, IVF, toco  -anticipate vaginal delivery    D/w Dr. Nicky Ching PGY1
-anesthesia called for epidural  -expect   -category 1 tracing    [facility]

## 2022-02-22 LAB — T PALLIDUM AB TITR SER: NEGATIVE — SIGNIFICANT CHANGE UP

## 2022-02-22 RX ORDER — IBUPROFEN 200 MG
600 TABLET ORAL EVERY 6 HOURS
Refills: 0 | Status: DISCONTINUED | OUTPATIENT
Start: 2022-02-22 | End: 2022-02-23

## 2022-02-22 RX ADMIN — SODIUM CHLORIDE 3 MILLILITER(S): 9 INJECTION INTRAMUSCULAR; INTRAVENOUS; SUBCUTANEOUS at 06:22

## 2022-02-22 RX ADMIN — SODIUM CHLORIDE 3 MILLILITER(S): 9 INJECTION INTRAMUSCULAR; INTRAVENOUS; SUBCUTANEOUS at 23:15

## 2022-02-22 RX ADMIN — Medication 1 TABLET(S): at 11:46

## 2022-02-22 NOTE — PROGRESS NOTE ADULT - SUBJECTIVE AND OBJECTIVE BOX
S: Patient doing well. No complaints. Minimal lochia. Pain controlled.    O: Vital Signs Last 24 Hrs  T(C): 37 (2022 06:00), Max: 37 (2022 16:17)  T(F): 98.6 (2022 06:00), Max: 98.6 (2022 16:17)  HR: 63 (2022 06:00) (50 - 83)  BP: 118/74 (2022 06:00) (98/55 - 166/77)  BP(mean): --  RR: 18 (2022 06:00) (16 - 18)  SpO2: 100% (2022 06:00) (95% - 100%)    Gen: NAD  Abd: soft, Nontender, Nondistended, fundus firm  Ext: no tendern, mild edema    Labs:                        11.4   7.37  )-----------( 216      ( 2022 17:28 )             33.8       A: 41y PPD#1 s/p  doing well.    Plan:  Routine postpartum care  Encouraged out of bed  Regular diet

## 2022-02-23 VITALS
DIASTOLIC BLOOD PRESSURE: 71 MMHG | TEMPERATURE: 99 F | SYSTOLIC BLOOD PRESSURE: 117 MMHG | RESPIRATION RATE: 18 BRPM | OXYGEN SATURATION: 99 % | HEART RATE: 64 BPM

## 2022-02-23 RX ORDER — SENNA PLUS 8.6 MG/1
2 TABLET ORAL AT BEDTIME
Refills: 0 | Status: DISCONTINUED | OUTPATIENT
Start: 2022-02-23 | End: 2022-02-23

## 2022-02-23 RX ADMIN — SENNA PLUS 2 TABLET(S): 8.6 TABLET ORAL at 01:13

## 2022-02-23 RX ADMIN — Medication 1 APPLICATION(S): at 10:00

## 2022-02-23 RX ADMIN — Medication 975 MILLIGRAM(S): at 01:04

## 2022-02-23 RX ADMIN — Medication 975 MILLIGRAM(S): at 02:00

## 2022-05-31 ENCOUNTER — APPOINTMENT (OUTPATIENT)
Dept: OBGYN | Facility: CLINIC | Age: 42
End: 2022-05-31

## 2022-07-07 NOTE — OB RN DELIVERY SUMMARY - NS_DELIVERYATTENDING1_OBGYN_ALL_OB_FT
Pt partner called in with concerns. Spoke with pt. Concerns regarding \"bumb down there\" states it maybe getting bigger. Requested apt to evaluate. Apt scheduled 7.13.22   She will complete lab work today. Pt into clinic to  lab orders  Needs assessed. 1 food and 2 gas vouchers given to pt via [facility]    Denies any needs at this time. Denies any issues that need Doctor attention.  Will call with concerns
Ruth Ortega

## 2022-10-10 NOTE — OB PROVIDER DELIVERY SUMMARY - NSPOSITIONATDELIA_OBGYN_ALL_OB
Daily Note     Today's date: 2022  Patient name: Deneen Reaves  : 1946  MRN: [id number]  Referring provider: Brigitte Finney DO  Dx:   Encounter Diagnosis     ICD-10-CM    1  Chronic bilateral low back pain without sciatica  M54 50     G89 29    2  Other spondylosis, lumbar region  M47 896                   Subjective: Patient reported soreness after the last session  Notes most of his soreness when sitting for long periods of time  Objective: See treatment diary below      Assessment: Tolerated treatment well  Progressed patient strengthening in supine this date to tolerance  Continues to show fatigue with AG resistance activities  Improved activity tolerance as compared to  session evidenced with improved sit to stand form and decreased rest break periods  Subjective reports of decreased pain and soreness at end of session  Encouraged patient to continue exercises at home  Continue to progress standing activity moving forwards  Patient would benefit from continued PT      Plan: Continue per plan of care        Diagnosis: LBP   Precautions: PD, uncontrolled type 2 DM, uses SPC/walker, fall risk, self reported angina (no nitro)    POC Expires:     Re-evaluation Date:    FOTO Scores/Date:  ()   Visit Count 1 2 3 4    Manuals     LS STM        LS Mobs                                 Ther Ex        LTR 15x HEP 10" 10x  10x3" ea side     Glute bridge  10x  2x10     Clamshell  Modified supine 10x ea side      TrA bracing 10x HEP 10" 10x      Posterior pelvic tilts   10x  20x 3" hold     Ball flexion    15x      Thoracic extensions    20x 3" hold      Standing marches     2x10 ea leg     Sit to stands   2x10 gait belt  x10 gait belt    SLR    2x10 ea leg quad set    SAQ  LAQ x10 LAQ 2x10 4# ea leg     Trunk twists    20x ea side      Hamstring stretch w/ strap        Hip 3 way     Extensions 2x10 ea leg     Neuro Re-Ed        Hip isometrics  5" 10x hip donna Gregory [phone]   Spoke with Bri Toledo - she was not able to visit with pt on Friday as she was in the ER for back pain- Bri , RN did mention pt stated she started back on the Tyvaso 3 breaths - Bir will evaluate pt this week and will call RN with update   abd/add 15x 3" hold abd and marches      TrA brace w/ marches  10x ea  10x ea leg     TrA bracing     20x 3"             Quad set     x10 ea leg                                                                            Ther Act              Step ups                            Modalities Left Occiput Anterior

## 2023-03-16 NOTE — OB RN TRIAGE NOTE - GRAVIDA, OB PROFILE
[address] [facility] [address], [address]        [place] [address] [facility]        Claudette Bobo: [phone]        F: [phone]       Clinical Data            Dr. Shelly Burns MD MS          Dr. Hany Palomino MD PhD    ON TREATMENT VISIT (OTV) NOTE     Date of Service: 3/16/2023  Patient ID: Larry García   : 1963  MRN: [id number]   Acct Number: [de-identified]     RADIATION ONCOLOGY ATTENDING:  Alvarez Lawrence PhD, MD    DIAGNOSIS:  Cancer Staging  No matching staging information was found for the patient. Treatment Area: Thoracic    Current Total Dose(cGy): 360  Current Fraction:   Final/Cumulative Rx. Dose (cGy): 5040    Patient was seen today for weekly visit. Wt Readings from Last 3 Encounters:   23 193 lb 9 oz (87.8 kg)   23 191 lb 9.6 oz (86.9 kg)   23 190 lb 12.8 oz (86.5 kg)       BP (!) 141/85   Pulse 75   Temp 98.1 °F (36.7 °C) (Infrared)   Resp 16   Wt 193 lb 9 oz (87.8 kg)   SpO2 94%   BMI 34.29 kg/m²     Lab Results   Component Value Date    WBC 5.6 10/07/2011     10/07/2011       Comfort Alteration  Fatigue:Able to perform daily activities with rest periods    Pain Location: n/a  Pain Intensity (Current): 0 No Pain  Pain Treatment: N/A  Pain Relief: n/a    Emotional Alteration:   Coping: effective    Nutritional Alteration  Anorexia: none   Nausea: No nausea noted  Vomiting: No vomiting   Dyspepsia/Heartburn: None  Dysphagia/Esophagitis: None    Skin Alteration   Skin reaction: No changes noted    Mucous Membrane Alteration  Mucositis XRT Related: None  Pharynx and Esophagus- Acute: No change over baseline  Voice Changes: Normal    Ventilation Alteration  Cough: None  Hemoptysis: None  Dyspnea: Normal  Mucous Quantity/Quality: n/a    Additional Comments: Using Eucerin daily.     MEDICATIONS:     Current Outpatient Medications   Medication Sig Dispense Refill    ONETOUCH VERIO strip TEST FIVE TIMES PER DAY      fluticasone (FLONASE) 50 MCG/ACT nasal spray 1 spray by Nasal route daily      SEMGLEE, YFGN, 100 UNIT/ML SOPN ADMINISTER 22 UNITS UNDER THE SKIN DAILY      insulin lispro, 1 Unit Dial, (HUMALOG/ADMELOG) 100 UNIT/ML SOPN       metFORMIN (GLUCOPHAGE-XR) 500 MG extended release tablet TAKE 1 TABLET BY MOUTH DAILY      loratadine-pseudoephedrine (CLARITIN-D 24HR)  MG per extended release tablet Take 1 tablet by mouth daily      omeprazole (PRILOSEC) 10 MG delayed release capsule Take 10 mg by mouth daily      Omega-3 Fatty Acids (FISH OIL) 1000 MG capsule Take by mouth daily      Cholecalciferol (VITAMIN D3) 50 MCG (2000 UT) CAPS Take by mouth      Red Yeast Rice 600 MG CAPS Take by mouth       No current facility-administered medications for this encounter. * New    PHYSICAL EXAM:       ECO - Asymptomatic (Fully active, able to carry on all pre-disease activities without restriction)     General: NAD, AO x 3, Mentation is clear with appropriate affect. HEENT: Normocephalic, atraumatic  Thorax:  Unlabored  COR: Nontachycardic  Abdomen:  Non-distended  Neuro:  Cranial nerves grossly intact; no focal deficits  Skin - treatment portal: SEE above. Unremarkable    Chemotherapy Update: Concurrent chemotherapy    Treatment Imaging: CBCT and All imaging reviewed and approved by Dr. Ronel Leyva: No significant radiation side effects. New medications, diagnostic results: No new images, medications, or changes to current recommendations    PLAN: Again reviewed potential side effects of radiation for the patient's treatment. Continue local/topical care. Continue current radiation course as prescribed. 6

## 2023-09-04 NOTE — OB RN TRIAGE NOTE - PRO MENTAL HEALTH SX RECENT
Anesthesia Pre Eval Note    Anesthesia ROS/Med Hx    Overall Review:  EKG was reviewed and Stress test was reviewed     Anesthetic Complication History:  Patient does not have a history of anesthetic complications      Pulmonary Review:  Patient does not have a pulmonary history      Neuro/Psych Review:  Patient does not have a neuro/psych history       Cardiovascular Review:    Positive for hypertension  Positive for hyperlipidemia    GI/HEPATIC/RENAL Review:    Positive for renal disease - chronic renal insufficiency    End/Other Review:  Positive for diabetes - poorly controlled - using insulin  Positive for obesity class I - 30.00 - 34.99  Positive for hypothyroidism  Additional Results:     ALLERGIES:   -- Liraglutide -- Nausea & Vomiting    --  Saxenda : took appetite away       Last Labs        Component                Value               Date/Time                  WBC                      11.1 (H)            08/19/2023 0915            RBC                      4.26 (L)            08/19/2023 0915            HGB                      12.7 (L)            08/19/2023 0915            HCT                      40.5                08/19/2023 0915            MCV                      95.1                08/19/2023 0915            MCH                      29.8                08/19/2023 0915            MCHC                     31.4 (L)            08/19/2023 0915            RDW-CV                   14.1                08/19/2023 0915            Sodium                   135                 08/25/2023 0928            Potassium                5.1                 08/25/2023 0928            Chloride                 105                 08/25/2023 0928            Carbon Dioxide           23                  08/25/2023 0928            Glucose                  154 (H)             08/25/2023 0928            BUN                      27 (H)              08/25/2023 0928            Creatinine               1.74 (H)            08/25/2023  0928            Glomerular Filtrati*     42 (L)              08/25/2023 0928            Calcium                  8.9                 08/25/2023 0928            PLT                      342                 08/19/2023 0915            INR                      1.0                 08/19/2023 0915        Past Medical History:  No date: Cataracts, bilateral  No date: Diabetes mellitus (CMD)  No date: Essential (primary) hypertension  No date: Gout  No date: High cholesterol  No date: Nonproliferative diabetic retinopathy (CMD)      Comment:  mild  No date: PONV (postoperative nausea and vomiting)  No date: Thyroid condition    Past Surgical History:  No date: Epidural steroid injection        Prior to Admission medications :  Medication valsartan (DIOVAN) 160 MG tablet, Sig Take 1 tablet by mouth daily., Start Date 8/19/23, End Date , Taking? , Authorizing Provider Ashley Coto, DO    Medication metformin (GLUCOPHAGE) 1000 MG tablet, Sig Take 1 tablet by mouth every 12 hours., Start Date 8/19/23, End Date 8/18/24, Taking? , Authorizing Provider Ashley Coot, DO    Medication levothyroxine 125 MCG tablet, Sig Take 1 tablet by mouth daily., Start Date 8/19/23, End Date , Taking? , Authorizing Provider Ashley Coto, DO    Medication atorvastatin (LIPITOR) 40 MG tablet, Sig Take 1 tablet by mouth daily., Start Date 8/19/23, End Date , Taking? , Authorizing Provider Ashley Coto, DO    Medication fenofibrate (TRICOR) 54 MG tablet, Sig Take 1 tablet by mouth daily., Start Date 8/19/23, End Date , Taking? , Authorizing Provider Ashley Coto, DO    Medication Omega-3 Fatty Acids (Fish Oil) 435 MG Cap, Sig 4 caps BID, Start Date , End Date , Taking? , Authorizing Provider Provider, Outside    Medication Continuous Blood Gluc Sensor (FreeStyle Horacio 2 Sensor) Misc, Sig 1 Device every 14 days., Start Date 6/28/23, End Date , Taking? , Authorizing Provider sAhley Coto, DO    Medication insulin glargine (LANTUS) 100 UNIT/ML vial  solution, Sig Inject 10 Units into the skin nightly., Start Date 5/25/23, End Date , Taking? , Authorizing Provider Ashley Coto, DO    Medication Insulin Lispro, 1 Unit Dial, (HumaLOG KwikPen) 100 UNIT/ML pen-injector, Sig Inject TID before meals per sliding scale. Blood sugar reading 150-250 take 5 units, 250-350 take 7 units, >350 take 10 units. Prime 2 units before each dose.  Patient taking differently: Indications: pt states only twice daily but instructed pt that order says TID Inject TID before meals per sliding scale. Blood sugar reading 150-250 take 5 units, 250-350 take 7 units, >350 take 10 units. Prime 2 units before each dose., Start Date 5/25/23, End Date , Taking? , Authorizing Provider Ashley Coto, DO    Medication Insulin Pen Needle (BD Pen Needle Jessica 2nd Gen) 32G X 4 MM Misc, Sig Use to inject insulin 4 times daily. Remove needle cover(s) to expose needle before injecting., Start Date 4/14/23, End Date , Taking? , Authorizing Provider Ashley Coto, DO    Medication omeprazole (PrilOSEC) 20 MG capsule, Sig Take 1 capsule by mouth daily., Start Date 3/6/23, End Date , Taking? , Authorizing Provider Ashley Coto, DO    Medication sildenafil (REVATIO) 20 MG tablet, Sig Take 2-3 tablets by mouth 1 hour before needed., Start Date 3/6/23, End Date , Taking? , Authorizing Provider Ashley Coto, DO    Medication ondansetron (ZOFRAN ODT) 4 MG disintegrating tablet, Sig DISSOLVE 1 TABLET ON THE TONGUE EVERY 6 HOURS AS NEEDED FOR NAUSEA, Start Date 6/11/22, End Date , Taking? , Authorizing Provider Ashley Coto, DO    Medication Continuous Blood Gluc  (FreeStyle Horacio 2 [place]) Device, Sig 1 Device continuous., Start Date 11/30/21, End Date , Taking? , Authorizing Provider Ashley Coto, DO    Medication aspirin (ECOTRIN) 81 MG EC tablet, Sig Take 81 mg by mouth daily., Start Date , End Date , Taking? , Authorizing Provider Provider, Outside         Patient Vitals in the past 24 hrs:      Relevant  Problems   No relevant active problems       Physical Exam     Airway   Mallampati: II  TM Distance: >3 FB  Neck ROM: Full  Neck: Non-tender and Able to place in sniff position  TMJ Mobility: Good    Cardiovascular  Cardiovascular exam normal  Cardio Rhythm: Regular  Cardio Rate: Normal    Head Assessment  Head assessment: Normocephalic and Atraumatic    General Assessment  General Assessment: Alert and oriented and No acute distress    Dental Exam  Dental exam normal  Patient has:  Denied broken/chipped/loose teeth    Pulmonary Exam  Pulmonary exam normal  Breath sounds clear to auscultation:  Yes    Abdominal Exam  Abdominal exam normal  Patient Demonstrates:  Obese      Anesthesia Plan:  No phone call attempted due to:  ASA Status: 3  Anesthesia Type: General    Induction: Intravenous  Preferred Airway Type: ETT  Maintenance: Inhalational  Premedication: IV      Post-op Pain Management: Per Surgeon      Checklist  Reviewed: Lab Results, EKG, Chest X-Rays, Nursing Notes, Patient Summary, Beta Blocker Status, Outside Records, NPO Status, Problem list, Medications, Allergies and Past Med History  Consent/Risks Discussed Statement:  The proposed anesthetic plan, including its risks and benefits, have been discussed with the Patient and Spouse along with the risks and benefits of alternatives. Questions were encouraged and answered and the patient and/or representative understands and agrees to proceed.        I discussed with the patient (and/or patient's legal representative) the risks and benefits of the proposed anesthesia plan, General, which may include services performed by other anesthesia providers.    Alternative anesthesia plans, if available, were reviewed with the patient (and/or patient's legal representative). Discussion has been held with the patient (and/or patient's legal representative) regarding risks of anesthesia, which include Nausea, Vomiting, Headache, Sore Throat, Dental Injury, Hypotension,  Allergic Reaction, Nerve Injury, Aspiration and Intra-operative Awareness and emergent situations that may require change in anesthesia plan.    The patient (and/or patient's legal representative) has indicated understanding, his/her questions have been answered, and he/she wishes to proceed with the planned anesthetic.    Blood Products: Not Anticipated     none

## 2024-09-20 NOTE — OB RN DELIVERY SUMMARY - BABY A: WEIGHT IN POUNDS (FROM GRAMS), DELIVERY
Pt to meet at least 75% of nutritional needs consistently   Pt to recall and verbalize at least 3 nutrition education points  7

## 2025-04-03 NOTE — DISCHARGE NOTE OB - PLAN OF CARE
Pt called office. She wants Dr. Tilley to know that she has an appointment with Dr. Cardoso on May 12 at 10am.    Electronically signed by Ayesha Magallanes on 4/3/2025 at 9:24 AM     recovery with Dr. Penaloza in 6 weeks

## 2025-04-30 NOTE — PATIENT PROFILE OB - SOURCE OF INFORMATION, OB PROFILE
DIAGNOSIS     Urinary retention   VRE (vancomycin resistant      DATE RECEIVED:  05.01.2025    NOTES (Gather within 2 years) STATUS DETAILS   OFFICE NOTE from referring provider   Internal 04.30.2025 Long Mena MD    LABS (any labs) Internal    MEDICATION LIST Internal        
patient
